# Patient Record
Sex: FEMALE | Race: WHITE | Employment: FULL TIME | URBAN - NONMETROPOLITAN AREA
[De-identification: names, ages, dates, MRNs, and addresses within clinical notes are randomized per-mention and may not be internally consistent; named-entity substitution may affect disease eponyms.]

---
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
## 2021-05-02 ENCOUNTER — HOSPITAL ENCOUNTER (OUTPATIENT)
Age: 21
Discharge: HOME | End: 2021-05-02
Attending: OBSTETRICS & GYNECOLOGY | Admitting: OBSTETRICS & GYNECOLOGY
Payer: COMMERCIAL

## 2021-05-02 VITALS
SYSTOLIC BLOOD PRESSURE: 115 MMHG | TEMPERATURE: 98.8 F | BODY MASS INDEX: 29.71 KG/M2 | HEIGHT: 64 IN | HEART RATE: 102 BPM | DIASTOLIC BLOOD PRESSURE: 75 MMHG | RESPIRATION RATE: 16 BRPM | WEIGHT: 174 LBS

## 2021-05-02 PROBLEM — Z3A.33 33 WEEKS GESTATION OF PREGNANCY: Status: ACTIVE | Noted: 2021-05-02

## 2021-05-02 PROCEDURE — 99211 OFF/OP EST MAY X REQ PHY/QHP: CPT

## 2021-05-02 RX ORDER — PROMETHAZINE HYDROCHLORIDE 12.5 MG/1
12.5 TABLET ORAL EVERY 6 HOURS PRN
Status: DISCONTINUED | OUTPATIENT
Start: 2021-05-02 | End: 2021-05-02 | Stop reason: HOSPADM

## 2021-05-02 RX ORDER — ACETAMINOPHEN 325 MG/1
650 TABLET ORAL EVERY 4 HOURS PRN
Status: DISCONTINUED | OUTPATIENT
Start: 2021-05-02 | End: 2021-05-02 | Stop reason: HOSPADM

## 2021-05-02 RX ORDER — ONDANSETRON 2 MG/ML
4 INJECTION INTRAMUSCULAR; INTRAVENOUS EVERY 6 HOURS PRN
Status: DISCONTINUED | OUTPATIENT
Start: 2021-05-02 | End: 2021-05-02 | Stop reason: HOSPADM

## 2021-05-02 RX ORDER — SODIUM CHLORIDE 0.9 % (FLUSH) 0.9 %
5-40 SYRINGE (ML) INJECTION PRN
Status: DISCONTINUED | OUTPATIENT
Start: 2021-05-02 | End: 2021-05-02 | Stop reason: HOSPADM

## 2021-05-02 RX ORDER — SODIUM CHLORIDE 0.9 % (FLUSH) 0.9 %
5-40 SYRINGE (ML) INJECTION EVERY 12 HOURS SCHEDULED
Status: DISCONTINUED | OUTPATIENT
Start: 2021-05-02 | End: 2021-05-02 | Stop reason: HOSPADM

## 2021-05-02 RX ORDER — SODIUM CHLORIDE 9 MG/ML
25 INJECTION, SOLUTION INTRAVENOUS PRN
Status: DISCONTINUED | OUTPATIENT
Start: 2021-05-02 | End: 2021-05-02 | Stop reason: HOSPADM

## 2021-05-02 NOTE — PROGRESS NOTES
Pt arrived to unit with c/o vaginal bleeding since 1200 today. Pt reports positive fetal movement. Denies headache, leaking of fluid, n/v, epigastric pain or contraction pain. EFM and TOCO applied to a soft, nontender abdomen. Will monitor and notify physician of arrival and status.

## 2021-05-07 ENCOUNTER — INITIAL PRENATAL (OUTPATIENT)
Dept: OBGYN CLINIC | Age: 21
End: 2021-05-07

## 2021-05-07 VITALS
WEIGHT: 179 LBS | HEART RATE: 92 BPM | DIASTOLIC BLOOD PRESSURE: 76 MMHG | SYSTOLIC BLOOD PRESSURE: 121 MMHG | BODY MASS INDEX: 30.73 KG/M2

## 2021-05-07 DIAGNOSIS — Z3A.34 34 WEEKS GESTATION OF PREGNANCY: Primary | ICD-10-CM

## 2021-05-07 PROCEDURE — 0502F SUBSEQUENT PRENATAL CARE: CPT | Performed by: OBSTETRICS & GYNECOLOGY

## 2021-05-07 NOTE — PROGRESS NOTES
Pt is here for prenatal appointment. She denies spotting. She has cramping and contractions, they aren't consistent.

## 2021-05-07 NOTE — PROGRESS NOTES
I, Martha Reyes RN, am scribing for and in the presence of Dr. Markie Campos 10:20 AM/sign      Subjective:  Cameron Lincoln is here for a return obstetrical visit. Today she is 34w1d weeks EGA. She is doing well, taking her PNV as directed, and has no complaints. Having some sylvia maxwell. She  does not have vaginal bleeding, leaking, contractions, syncope, or headaches. Pt does feel fetal movement regularly. Objective: Mother's Prenatal Vitals  BP: 121/76  Weight: 179 lb (81.2 kg)  Pulse: 92  Patient Position: Sitting  Prenatal Fetal Information  Fetal Heart Rate: US-125  Movement: Present  Cervical Exam  Presentation: Vertex  Pt is A&Ox3, in no acute distress. Normocephalic, atraumatic. PERRL. Resp even and non-labored. Skin pink, warm & dry. Gravid abdomen. DE LA CRUZ's well. Gait steady. Assessment:    IUP at 34w1d wks      Diagnosis Orders   1. 34 weeks gestation of pregnancy       Plan:  Pt counseled on balanced nutrition, adequate fluid intake, taking PNV daily, and exercise along with GHTN precautions, Kick count and  labor  Continue with routine prenatal care. OB records still pending   surveillance not indicated  RTC in 2 wks for prenatal visit    MEDICATIONS:  No orders of the defined types were placed in this encounter. ORDERS:  No orders of the defined types were placed in this encounter. Justin Marin MD, personally performed services described in this document as scribed by Leonard Lepe RN in my presence, and it is both accurate and complete.

## 2021-05-21 ENCOUNTER — ROUTINE PRENATAL (OUTPATIENT)
Dept: OBGYN CLINIC | Age: 21
End: 2021-05-21

## 2021-05-21 VITALS
SYSTOLIC BLOOD PRESSURE: 118 MMHG | WEIGHT: 184 LBS | HEART RATE: 100 BPM | BODY MASS INDEX: 31.58 KG/M2 | DIASTOLIC BLOOD PRESSURE: 72 MMHG

## 2021-05-21 DIAGNOSIS — Z3A.36 36 WEEKS GESTATION OF PREGNANCY: Primary | ICD-10-CM

## 2021-05-21 PROCEDURE — 0502F SUBSEQUENT PRENATAL CARE: CPT | Performed by: OBSTETRICS & GYNECOLOGY

## 2021-05-21 NOTE — PATIENT INSTRUCTIONS
[email or web address] and sign in to your Sy[employer]ix account. Enter D181 in the [facility] box to learn more about \"Weeks 34 to 36 of Your Pregnancy: Care Instructions. \"     If you do not have an account, please click on the \"Sign Up Now\" link. Current as of: October 8, 2020               Content Version: 12.8  © 2006-2021 [facility], [facility]. Care instructions adapted under license by [facility] ([facility]). If you have questions about a medical condition or this instruction, always ask your healthcare professional. [place] [address] any warranty or liability for your use of this information.

## 2021-05-24 LAB — GROUP B STREP CULTURE: NORMAL

## 2021-05-27 ENCOUNTER — HOSPITAL ENCOUNTER (OUTPATIENT)
Age: 21
Discharge: HOME | End: 2021-05-27
Attending: OBSTETRICS & GYNECOLOGY | Admitting: OBSTETRICS & GYNECOLOGY
Payer: COMMERCIAL

## 2021-05-27 VITALS
HEART RATE: 111 BPM | RESPIRATION RATE: 18 BRPM | DIASTOLIC BLOOD PRESSURE: 60 MMHG | SYSTOLIC BLOOD PRESSURE: 118 MMHG | TEMPERATURE: 97.8 F

## 2021-05-27 PROBLEM — O42.90 RUPTURE OF MEMBRANES WITH DELAY OF DELIVERY: Status: ACTIVE | Noted: 2021-05-27

## 2021-05-27 PROCEDURE — 99211 OFF/OP EST MAY X REQ PHY/QHP: CPT

## 2021-05-27 RX ORDER — ONDANSETRON 2 MG/ML
4 INJECTION INTRAMUSCULAR; INTRAVENOUS EVERY 6 HOURS PRN
Status: DISCONTINUED | OUTPATIENT
Start: 2021-05-27 | End: 2021-05-27 | Stop reason: HOSPADM

## 2021-05-27 RX ORDER — ACETAMINOPHEN 325 MG/1
650 TABLET ORAL EVERY 4 HOURS PRN
Status: DISCONTINUED | OUTPATIENT
Start: 2021-05-27 | End: 2021-05-27 | Stop reason: HOSPADM

## 2021-05-27 RX ORDER — PROMETHAZINE HYDROCHLORIDE 12.5 MG/1
12.5 TABLET ORAL EVERY 6 HOURS PRN
Status: DISCONTINUED | OUTPATIENT
Start: 2021-05-27 | End: 2021-05-27 | Stop reason: HOSPADM

## 2021-05-27 NOTE — FLOWSHEET NOTE
Patient presents to L&D with complaints of leaking of fluid after a scant amount of bleeding last night. Reports positive fetal movement. States she is is saturating thru undergarments and jeans.

## 2021-05-28 ENCOUNTER — ROUTINE PRENATAL (OUTPATIENT)
Dept: OBGYN CLINIC | Age: 21
End: 2021-05-28

## 2021-05-28 VITALS
BODY MASS INDEX: 31.76 KG/M2 | HEART RATE: 106 BPM | SYSTOLIC BLOOD PRESSURE: 119 MMHG | WEIGHT: 185 LBS | DIASTOLIC BLOOD PRESSURE: 74 MMHG

## 2021-05-28 DIAGNOSIS — Z34.83 ENCOUNTER FOR SUPERVISION OF OTHER NORMAL PREGNANCY IN THIRD TRIMESTER: Primary | ICD-10-CM

## 2021-05-28 DIAGNOSIS — O47.9 THREATENED LABOR AT TERM: ICD-10-CM

## 2021-05-28 DIAGNOSIS — Z3A.37 37 WEEKS GESTATION OF PREGNANCY: ICD-10-CM

## 2021-05-28 PROCEDURE — 0502F SUBSEQUENT PRENATAL CARE: CPT | Performed by: NURSE PRACTITIONER

## 2021-05-28 NOTE — PATIENT INSTRUCTIONS
Patient Education        Week 37 of Your Pregnancy: Care Instructions  Your Care Instructions     You are near the end of your pregnancy--and you're probably pretty uncomfortable. It may be harder to walk around. Lying down probably isn't comfortable either. You may have trouble getting to sleep or staying asleep. Most women deliver their babies between 40 and 41 weeks. This is a good time to think about packing a bag for the hospital with items you'll need. Then you'll be ready when labor starts. Follow-up care is a key part of your treatment and safety. Be sure to make and go to all appointments, and call your doctor if you are having problems. It's also a good idea to know your test results and keep a list of the medicines you take. How can you care for yourself at home? Learn about breastfeeding  · Breastfeeding is best for your baby and good for you. · Breast milk has antibodies to help your baby fight infections. · Mothers who breastfeed often lose weight faster, because making milk burns calories. · Learning the best ways to hold your baby will make breastfeeding easier. · Let your partner bathe and diaper the baby to keep your partner from feeling left out. Snuggle together when you breastfeed. · You may want to learn how to use a breast pump and store your milk. · If you choose to bottle feed, make the feeding feel like breastfeeding so you can bond with your baby. Always hold your baby and the bottle. Do not prop bottles or let your baby fall asleep with a bottle. Learn about crying  · It is common for babies to cry for 1 to 3 hours a day. Some cry more, some cry less. · Babies don't cry to make you upset or because you are a bad parent. · Crying is how your baby communicates. Your baby may be hungry; have gas; need a diaper change; or feel cold, warm, tired, lonely, or tense. Sometimes babies cry for unknown reasons.   · If you respond to your baby's needs, he or she will learn to trust you.  · Try to stay calm when your baby cries. Your baby may get more upset if he or she senses that you are upset. Know how to care for your   · Your baby's umbilical cord stump will drop off on its own, usually between 1 and 2 weeks. To care for your baby's umbilical cord area:  ? Clean the area at the bottom of the cord 2 or 3 times a day. ? Pay special attention to the area where the cord attaches to the skin. ? Keep the diaper folded below the cord. ? Use a damp washcloth or cotton ball to sponge bathe your baby until the stump has come off. · Your baby's first dark stool is called meconium. After the meconium is passed, your baby will develop his or her own bowel pattern. ? Some babies, especially  babies, have several bowel movements a day. Others have one or two a day, or one every 2 to 3 days. ?  babies often have loose, yellow stools. Formula-fed babies have more formed stools. ? If your baby's stools look like little pellets, he or she is constipated. After 2 days of constipation, call your baby's doctor. · If your baby will be circumcised, you can care for him at home. ? Gently rinse his penis with warm water after every diaper change. Do not try to remove the film that forms on the penis. This film will go away on its own. Pat dry. ? Put petroleum ointment, such as Vaseline, on the area of the diaper that will touch your baby's penis. This will keep the diaper from sticking to your baby. ? Ask the doctor about giving your baby acetaminophen (Tylenol) for pain. Where can you learn more? Go to https://chnedeb.healthSvpply. org and sign in to your [employer] account. Enter [id number] in the [employer] box to learn more about \"Week 37 of Your Pregnancy: Care Instructions. \"     If you do not have an account, please click on the \"Sign Up Now\" link. Current as of: 2020               Content Version: 12.8  © 6091-8370 Healthwise, [employer]. Care instructions adapted under license by [facility] ([facility]). If you have questions about a medical condition or this instruction, always ask your healthcare professional. [address] any warranty or liability for your use of this information.

## 2021-05-28 NOTE — PROGRESS NOTES
Elicia Duckworth is here for a return obstetrical visit. Today she is 37w1d weeks EGA. She is doing well and has no complaints. She  does not have vaginal bleeding, reports some leaking of fluid last night, contractions. Went to LDR and was elizabeth but negative for SROM. Neg Amnisure in office. She does not have blurred vision, SOB, or increased swelling in legs or face. Pt does feel fetal movement regularly. Objective: Mother's Prenatal Vitals  BP: 119/74  Weight: 185 lb (83.9 kg)  Pulse: 106  Patient Position: Sitting  Prenatal Fetal Information  Fundal Height (cm): 39 cm  Fetal Heart Rate: 139  Movement: Present  Cervical Exam  Dilation (cm): 3  Effacement (%): 70  Station: -2  Station (Labor Curve Graph): 7  Presentation: Vertex  Dil/Eff/Sta  Dilation (cm): 3  Effacement (%): 70  Station: -2  Pt is A&Ox3, in no acute distress. Normocephalic, atraumatic. PERRL. Resp even and non-labored. Skin pink, warm & dry. Gravid abdomen. DE LA CRUZ's well. Gait steady. Assessment:  IUP at 37w1d wks      Diagnosis Orders   1. Encounter for supervision of other normal pregnancy in third trimester     2. 37 weeks gestation of pregnancy       Plan:Pt counseled on labor precautions, GHTN precautions and Kick count  Continue with routine prenatal care. RTC in 1 wk for prenatal visit    MEDICATIONS:  No orders of the defined types were placed in this encounter. ORDERS:  No orders of the defined types were placed in this encounter.

## 2021-06-01 ENCOUNTER — HOSPITAL ENCOUNTER (OUTPATIENT)
Age: 21
Discharge: HOME | End: 2021-06-01
Attending: OBSTETRICS & GYNECOLOGY | Admitting: OBSTETRICS & GYNECOLOGY
Payer: COMMERCIAL

## 2021-06-01 ENCOUNTER — TELEPHONE (OUTPATIENT)
Dept: OBGYN CLINIC | Age: 21
End: 2021-06-01

## 2021-06-01 VITALS
SYSTOLIC BLOOD PRESSURE: 116 MMHG | TEMPERATURE: 97.6 F | DIASTOLIC BLOOD PRESSURE: 66 MMHG | RESPIRATION RATE: 18 BRPM | HEIGHT: 64 IN | BODY MASS INDEX: 31.07 KG/M2 | WEIGHT: 182 LBS | HEART RATE: 106 BPM

## 2021-06-01 PROBLEM — M54.50 LOW BACK PAIN: Status: ACTIVE | Noted: 2021-06-01

## 2021-06-01 LAB
AMORPHOUS: ABNORMAL /HPF
BACTERIA: ABNORMAL /HPF
BILIRUBIN URINE: NEGATIVE
BLOOD, URINE: NEGATIVE
CLARITY: ABNORMAL
COLOR: YELLOW
EPITHELIAL CELLS, UA: ABNORMAL /HPF
GLUCOSE URINE: NEGATIVE MG/DL
KETONES, URINE: NEGATIVE MG/DL
LEUKOCYTE ESTERASE, URINE: ABNORMAL
NITRITE, URINE: NEGATIVE
PH UA: 8.5 (ref 5–8)
PROTEIN UA: NEGATIVE MG/DL
RBC UA: ABNORMAL /HPF (ref 0–2)
SPECIFIC GRAVITY UA: 1.02 (ref 1–1.03)
UROBILINOGEN, URINE: 1 E.U./DL
WBC UA: ABNORMAL /HPF (ref 0–5)

## 2021-06-01 PROCEDURE — 99211 OFF/OP EST MAY X REQ PHY/QHP: CPT

## 2021-06-01 PROCEDURE — 84112 EVAL AMNIOTIC FLUID PROTEIN: CPT

## 2021-06-01 PROCEDURE — 81001 URINALYSIS AUTO W/SCOPE: CPT

## 2021-06-01 RX ORDER — CEPHALEXIN 500 MG/1
500 CAPSULE ORAL 2 TIMES DAILY
Qty: 14 CAPSULE | Refills: 0 | Status: SHIPPED | OUTPATIENT
Start: 2021-06-01 | End: 2021-06-03 | Stop reason: SDUPTHER

## 2021-06-01 NOTE — TELEPHONE ENCOUNTER
Pt called stating she called L & D yesterday having consistent right rib pain and being light headed. She was told this was normal. Last night and this AM pt has had a mucus discharge with a green color, no odor. Spoke with LENNOX Hernandez, pt is seeing Zoe Toro Thursday, this can be normal, but not the green discharge, if there is no odor, there can be some color to the mucus, even pink. As long as baby is still moving and no contractions, pt is to FU with Jose Rafael Young Thursday.  Pt v/u

## 2021-06-03 ENCOUNTER — ROUTINE PRENATAL (OUTPATIENT)
Dept: OBGYN CLINIC | Age: 21
End: 2021-06-03
Payer: COMMERCIAL

## 2021-06-03 ENCOUNTER — APPOINTMENT (OUTPATIENT)
Dept: ULTRASOUND IMAGING | Age: 21
End: 2021-06-03
Payer: COMMERCIAL

## 2021-06-03 ENCOUNTER — HOSPITAL ENCOUNTER (OUTPATIENT)
Age: 21
Discharge: HOME | End: 2021-06-03
Attending: OBSTETRICS & GYNECOLOGY | Admitting: OBSTETRICS & GYNECOLOGY
Payer: COMMERCIAL

## 2021-06-03 VITALS — SYSTOLIC BLOOD PRESSURE: 129 MMHG | BODY MASS INDEX: 31.41 KG/M2 | WEIGHT: 183 LBS | DIASTOLIC BLOOD PRESSURE: 76 MMHG

## 2021-06-03 VITALS — BODY MASS INDEX: 31.24 KG/M2 | TEMPERATURE: 98.4 F | HEIGHT: 64 IN | WEIGHT: 183 LBS | HEART RATE: 122 BPM

## 2021-06-03 DIAGNOSIS — Z3A.38 38 WEEKS GESTATION OF PREGNANCY: ICD-10-CM

## 2021-06-03 DIAGNOSIS — Z34.83 ENCOUNTER FOR SUPERVISION OF OTHER NORMAL PREGNANCY IN THIRD TRIMESTER: Primary | ICD-10-CM

## 2021-06-03 DIAGNOSIS — O36.8130 DECREASED FETAL MOVEMENTS IN THIRD TRIMESTER, SINGLE OR UNSPECIFIED FETUS: ICD-10-CM

## 2021-06-03 PROBLEM — O28.8 NST (NON-STRESS TEST) NONREACTIVE: Status: ACTIVE | Noted: 2021-06-03

## 2021-06-03 PROCEDURE — 99211 OFF/OP EST MAY X REQ PHY/QHP: CPT

## 2021-06-03 PROCEDURE — 0502F SUBSEQUENT PRENATAL CARE: CPT | Performed by: NURSE PRACTITIONER

## 2021-06-03 PROCEDURE — 76818 FETAL BIOPHYS PROFILE W/NST: CPT

## 2021-06-03 PROCEDURE — 59025 FETAL NON-STRESS TEST: CPT

## 2021-06-03 PROCEDURE — 59025 FETAL NON-STRESS TEST: CPT | Performed by: NURSE PRACTITIONER

## 2021-06-03 RX ORDER — CEPHALEXIN 500 MG/1
500 CAPSULE ORAL 2 TIMES DAILY
Qty: 14 CAPSULE | Refills: 0 | Status: ON HOLD | OUTPATIENT
Start: 2021-06-03 | End: 2021-06-11 | Stop reason: HOSPADM

## 2021-06-03 NOTE — PATIENT INSTRUCTIONS
Patient Education        Week 45 of Your Pregnancy: Care Instructions  Your Care Instructions     Believe it or not, your baby is almost here. You may have ideas about your baby's personality because of how much he or she moves. Or you may have noticed how he or she responds to sounds, warmth, cold, and light. You may even know what kind of music your baby likes. By now, you have a better idea of what to expect during delivery. You may have talked about your birth preferences with your doctor. But even if you want a vaginal birth, it is a good idea to learn about  births.  birth means that your baby is born through a cut (incision) in your lower belly. It is sometimes the best choice for the health of the baby and the mother. Follow-up care is a key part of your treatment and safety. Be sure to make and go to all appointments, and call your doctor if you are having problems. It's also a good idea to know your test results and keep a list of the medicines you take. How can you care for yourself at home? Learn about  birth  · Most C-sections are unplanned. They are done because of problems that occur during labor. These problems might include:  ? Labor that slows or stops. ? High blood pressure or other problems for the mother. ? Signs of distress in the baby. These signs may include a very fast or slow heart rate. · Although most mothers and babies do well after , it is major surgery. It has more risks than a vaginal delivery. · In some cases, a planned  may be safer than a vaginal delivery. This may be the case if:  ? The mother has a health problem, such as a heart condition. ? The baby isn't in a head-down position for delivery. This is called a breech position. ? The uterus has scars from past surgeries. This could increase the chance of a tear in the uterus. ? There is a problem with the placenta. ?  The mother has an infection, such as genital herpes, that could be spread to the baby. ? The mother is having twins or more. ? The baby weighs 9 to 10 pounds or more. · Because of the risks of , planned C-sections generally should be done only for medical reasons. And a planned  should be done at 39 weeks or later unless there is a medical reason to do it sooner. Know what to expect after delivery, and plan for the first few weeks at home  · You, your baby, and your partner or  will get identification bands. Only people with matching bands can  the baby from the nursery. · You will learn how to feed, diaper, and bathe your baby. And you will learn how to care for the umbilical cord stump. If your baby will be circumcised, you will also learn how to care for that. · Ask people to wait to visit you until you are at home. And ask them to wash their hands before they touch your baby. · Make sure you have another adult in your home for at least 2 or 3 days after the birth. · During the first 2 weeks, limit when friends and family can visit. · Do not allow visitors who have colds or infections. Make sure all visitors are up to date with their vaccinations. Never let anyone smoke around your baby. · Try to nap when the baby naps. Be aware of postpartum depression  · \"Baby blues\" are common for the first 1 to 2 weeks after birth. You may cry or feel sad or irritable for no reason. · For some women, these feelings last longer and are more intense. This is called postpartum depression. · If your symptoms last for more than a few weeks or you feel very depressed, ask your doctor for help. · Postpartum depression can be treated. Support groups and counseling can help. Sometimes medicine can also help. Where can you learn more? Go to https://stephani.Transactiv. org and sign in to your Ubimo account. Enter B044 in the [employer] box to learn more about \"Week 38 of Your Pregnancy: Care Instructions. \"     If you do not have an account, please click on the \"Sign Up Now\" link. Current as of: October 8, 2020               Content Version: 12.8  © 2006-2021 Healthwise, Incorporated. Care instructions adapted under license by [facility] ([facility]). If you have questions about a medical condition or this instruction, always ask your healthcare professional. [address] any warranty or liability for your use of this information.

## 2021-06-03 NOTE — PROGRESS NOTES
Liam Enamorado is here for a return obstetrical visit. Today she is 38w0d weeks EGA. She is doing well and has no complaints. She  does not have vaginal bleeding, leaking of fluid, contractions. She does not have blurred vision, SOB, or increased swelling in legs or face. Pt does not feel fetal movement regularly. Pt placed on NST and reassuring but not reactive. Sent to LDR for prolonged monitoring and BPP. Objective: Mother's Prenatal Vitals  BP: 129/76  Weight: 183 lb (83 kg)  Patient Position: Sitting  Alb/Glu  Albumin: Trace  Glucose: Negative  Prenatal Fetal Information  Fetal Heart Rate: NST-  Movement: (!) Decreased  Pt is A&Ox3, in no acute distress. Normocephalic, atraumatic. PERRL. Resp even and non-labored. Skin pink, warm & dry. Gravid abdomen. DE LA CRUZ's well. Gait steady. Assessment:  IUP at 38w0d wks      Diagnosis Orders   1. Encounter for supervision of other normal pregnancy in third trimester     2. 38 weeks gestation of pregnancy     3. Decreased fetal movements in third trimester, single or unspecified fetus  16901 - [place] F[place]ESS TEST     Plan:Pt counseled on labor precautions, GHTN precautions and Kick count  Continue with routine prenatal care. MEDICATIONS:  No orders of the defined types were placed in this encounter.       ORDERS:  Orders Placed This Encounter   Procedures    [id number] - [place] F[place]-STRESS TEST

## 2021-06-09 ENCOUNTER — HOSPITAL ENCOUNTER (INPATIENT)
Age: 21
LOS: 2 days | Discharge: HOME | End: 2021-06-11
Attending: OBSTETRICS & GYNECOLOGY | Admitting: OBSTETRICS & GYNECOLOGY
Payer: COMMERCIAL

## 2021-06-09 ENCOUNTER — APPOINTMENT (OUTPATIENT)
Dept: LABOR AND DELIVERY | Age: 21
End: 2021-06-09
Payer: COMMERCIAL

## 2021-06-09 PROBLEM — Z34.90 TERM PREGNANCY: Status: ACTIVE | Noted: 2021-06-09

## 2021-06-09 LAB
ABO/RH: NORMAL
AMPHETAMINE SCREEN, URINE: NEGATIVE
ANTIBODY SCREEN: NORMAL
BARBITURATE SCREEN URINE: NEGATIVE
BENZODIAZEPINE SCREEN, URINE: NEGATIVE
CANNABINOID SCREEN URINE: NEGATIVE
COCAINE METABOLITE SCREEN URINE: NEGATIVE
HCT VFR BLD CALC: 28.7 % (ref 37–47)
HEMOGLOBIN: 9 G/DL (ref 12–16)
Lab: NORMAL
MCH RBC QN AUTO: 24.3 PG (ref 27–31)
MCHC RBC AUTO-ENTMCNC: 31.4 G/DL (ref 33–37)
MCV RBC AUTO: 77.6 FL (ref 81–99)
OPIATE SCREEN URINE: NEGATIVE
PDW BLD-RTO: 15.5 % (ref 11.5–14.5)
PLATELET # BLD: 227 K/UL (ref 130–400)
PMV BLD AUTO: 11.6 FL (ref 9.4–12.3)
RBC # BLD: 3.7 M/UL (ref 4.2–5.4)
WBC # BLD: 8.8 K/UL (ref 4.8–10.8)

## 2021-06-09 PROCEDURE — 86900 BLOOD TYPING SEROLOGIC ABO: CPT

## 2021-06-09 PROCEDURE — 6360000002 HC RX W HCPCS: Performed by: OBSTETRICS & GYNECOLOGY

## 2021-06-09 PROCEDURE — 36415 COLL VENOUS BLD VENIPUNCTURE: CPT

## 2021-06-09 PROCEDURE — 6370000000 HC RX 637 (ALT 250 FOR IP): Performed by: OBSTETRICS & GYNECOLOGY

## 2021-06-09 PROCEDURE — 85027 COMPLETE CBC AUTOMATED: CPT

## 2021-06-09 PROCEDURE — 1220000000 HC SEMI PRIVATE OB R&B

## 2021-06-09 PROCEDURE — 86850 RBC ANTIBODY SCREEN: CPT

## 2021-06-09 PROCEDURE — 86901 BLOOD TYPING SEROLOGIC RH(D): CPT

## 2021-06-09 PROCEDURE — 86592 SYPHILIS TEST NON-TREP QUAL: CPT

## 2021-06-09 PROCEDURE — 80307 DRUG TEST PRSMV CHEM ANLYZR: CPT

## 2021-06-09 PROCEDURE — 7200000001 HC VAGINAL DELIVERY

## 2021-06-09 RX ORDER — SODIUM CHLORIDE, SODIUM LACTATE, POTASSIUM CHLORIDE, AND CALCIUM CHLORIDE .6; .31; .03; .02 G/100ML; G/100ML; G/100ML; G/100ML
500 INJECTION, SOLUTION INTRAVENOUS PRN
Status: DISCONTINUED | OUTPATIENT
Start: 2021-06-09 | End: 2021-06-11 | Stop reason: HOSPADM

## 2021-06-09 RX ORDER — ONDANSETRON 2 MG/ML
4 INJECTION INTRAMUSCULAR; INTRAVENOUS EVERY 6 HOURS PRN
Status: DISCONTINUED | OUTPATIENT
Start: 2021-06-09 | End: 2021-06-11 | Stop reason: HOSPADM

## 2021-06-09 RX ORDER — DOCUSATE SODIUM 100 MG/1
100 CAPSULE, LIQUID FILLED ORAL 2 TIMES DAILY
Status: DISCONTINUED | OUTPATIENT
Start: 2021-06-09 | End: 2021-06-11 | Stop reason: HOSPADM

## 2021-06-09 RX ORDER — SODIUM CHLORIDE, SODIUM LACTATE, POTASSIUM CHLORIDE, CALCIUM CHLORIDE 600; 310; 30; 20 MG/100ML; MG/100ML; MG/100ML; MG/100ML
INJECTION, SOLUTION INTRAVENOUS CONTINUOUS
Status: DISCONTINUED | OUTPATIENT
Start: 2021-06-09 | End: 2021-06-11 | Stop reason: HOSPADM

## 2021-06-09 RX ORDER — SODIUM CHLORIDE 0.9 % (FLUSH) 0.9 %
5-40 SYRINGE (ML) INJECTION EVERY 12 HOURS SCHEDULED
Status: DISCONTINUED | OUTPATIENT
Start: 2021-06-09 | End: 2021-06-11 | Stop reason: HOSPADM

## 2021-06-09 RX ORDER — IBUPROFEN 400 MG/1
800 TABLET ORAL EVERY 8 HOURS PRN
Status: DISCONTINUED | OUTPATIENT
Start: 2021-06-09 | End: 2021-06-11 | Stop reason: HOSPADM

## 2021-06-09 RX ORDER — SODIUM CHLORIDE 0.9 % (FLUSH) 0.9 %
5-40 SYRINGE (ML) INJECTION PRN
Status: DISCONTINUED | OUTPATIENT
Start: 2021-06-09 | End: 2021-06-11 | Stop reason: HOSPADM

## 2021-06-09 RX ORDER — SODIUM CHLORIDE, SODIUM LACTATE, POTASSIUM CHLORIDE, AND CALCIUM CHLORIDE .6; .31; .03; .02 G/100ML; G/100ML; G/100ML; G/100ML
1000 INJECTION, SOLUTION INTRAVENOUS PRN
Status: DISCONTINUED | OUTPATIENT
Start: 2021-06-09 | End: 2021-06-11 | Stop reason: HOSPADM

## 2021-06-09 RX ORDER — SODIUM CHLORIDE 9 MG/ML
25 INJECTION, SOLUTION INTRAVENOUS PRN
Status: DISCONTINUED | OUTPATIENT
Start: 2021-06-09 | End: 2021-06-11 | Stop reason: HOSPADM

## 2021-06-09 RX ORDER — OXYCODONE HYDROCHLORIDE AND ACETAMINOPHEN 5; 325 MG/1; MG/1
1 TABLET ORAL EVERY 4 HOURS PRN
Status: DISCONTINUED | OUTPATIENT
Start: 2021-06-09 | End: 2021-06-11 | Stop reason: HOSPADM

## 2021-06-09 RX ORDER — ACETAMINOPHEN 325 MG/1
650 TABLET ORAL EVERY 4 HOURS PRN
Status: DISCONTINUED | OUTPATIENT
Start: 2021-06-09 | End: 2021-06-11 | Stop reason: HOSPADM

## 2021-06-09 RX ADMIN — Medication 166.7 ML: at 15:45

## 2021-06-09 RX ADMIN — Medication 2 MILLI-UNITS/MIN: at 06:00

## 2021-06-09 RX ADMIN — DOCUSATE SODIUM 100 MG: 100 CAPSULE, LIQUID FILLED ORAL at 22:16

## 2021-06-09 RX ADMIN — OXYCODONE HYDROCHLORIDE AND ACETAMINOPHEN 1 TABLET: 5; 325 TABLET ORAL at 22:12

## 2021-06-09 RX ADMIN — IBUPROFEN 800 MG: 400 TABLET, FILM COATED ORAL at 17:25

## 2021-06-09 RX ADMIN — Medication 87.3 MILLI-UNITS/MIN: at 16:00

## 2021-06-09 ASSESSMENT — PAIN SCALES - GENERAL
PAINLEVEL_OUTOF10: 7
PAINLEVEL_OUTOF10: 7

## 2021-06-09 NOTE — PROGRESS NOTES
Pt writhing in bed c/o pressure. SVE at 1410: 9-10 cm. Dr. Margarita Harris notified. Pt continues to breathe with contractions. Family at bedside. US and toco adjusted.

## 2021-06-09 NOTE — LACTATION NOTE
Infant Name: Aziza Romero  Gestation: 38.6  Day of Life:   Birth weight:  Today's weight:  Weight loss:  24 hour summary of feeds:  Voids:  Stools:  Assistive device: none  Maternal History: ,   Breastfeeding history: yes, longest duration 2 wks. Maternal Medications: Keflex, PNV  Maternal Goal: one day at a time  Breast pump for home: yes    Assisted mother with positioning and latching baby to right breast, cross-cradle position. Hand expression taught, some colostrum noted. Jaw dropping sucks noted with gentle stimulation. Instructed mother to breastfeed every 2- 3 hours for 15-20 mins each side or on demand watching for hunger cues and using waking techniques when needed. 8-12 feedings in 24 hours being the goal. Hand expression and breast compressions encouraged to increase milk supply and transfer. Discussed the benefits of colostrum, skin to skin and the importance of good positioning and latch. Informed mother that baby can be very sleepy the first 24 hours and typically the 2nd night babies will be more awake and want to feed a lot and that this is normal and important in establishing milk supply. Discussed supply and demand. All questions answered at this time. Encouraged to call out for help when needed.

## 2021-06-09 NOTE — PROGRESS NOTES
Pt crying and screaming c/o pain and pressure. SVE- rim. Dr. Evelina Avila arrived [phone]. Pt started pushing. Delivery of viable male at 1440.

## 2021-06-10 LAB
HCT VFR BLD CALC: 31.5 % (ref 37–47)
HEMOGLOBIN: 9.7 G/DL (ref 12–16)
MCH RBC QN AUTO: 24.5 PG (ref 27–31)
MCHC RBC AUTO-ENTMCNC: 30.8 G/DL (ref 33–37)
MCV RBC AUTO: 79.5 FL (ref 81–99)
PDW BLD-RTO: 15.4 % (ref 11.5–14.5)
PLATELET # BLD: 257 K/UL (ref 130–400)
PMV BLD AUTO: 11.3 FL (ref 9.4–12.3)
RBC # BLD: 3.96 M/UL (ref 4.2–5.4)
RPR: NORMAL
WBC # BLD: 13.7 K/UL (ref 4.8–10.8)

## 2021-06-10 PROCEDURE — 59400 OBSTETRICAL CARE: CPT | Performed by: OBSTETRICS & GYNECOLOGY

## 2021-06-10 PROCEDURE — 36415 COLL VENOUS BLD VENIPUNCTURE: CPT

## 2021-06-10 PROCEDURE — 85027 COMPLETE CBC AUTOMATED: CPT

## 2021-06-10 PROCEDURE — 99024 POSTOP FOLLOW-UP VISIT: CPT | Performed by: NURSE PRACTITIONER

## 2021-06-10 PROCEDURE — 6370000000 HC RX 637 (ALT 250 FOR IP): Performed by: OBSTETRICS & GYNECOLOGY

## 2021-06-10 PROCEDURE — 1220000000 HC SEMI PRIVATE OB R&B

## 2021-06-10 RX ADMIN — DOCUSATE SODIUM 100 MG: 100 CAPSULE, LIQUID FILLED ORAL at 19:50

## 2021-06-10 RX ADMIN — IBUPROFEN 800 MG: 400 TABLET, FILM COATED ORAL at 03:10

## 2021-06-10 RX ADMIN — DOCUSATE SODIUM 100 MG: 100 CAPSULE, LIQUID FILLED ORAL at 08:38

## 2021-06-10 RX ADMIN — IBUPROFEN 800 MG: 400 TABLET, FILM COATED ORAL at 12:23

## 2021-06-10 ASSESSMENT — PAIN SCALES - GENERAL
PAINLEVEL_OUTOF10: 6
PAINLEVEL_OUTOF10: 6

## 2021-06-10 NOTE — LACTATION NOTE
Infant Name: Sujata Ferrell  Gestation: 38.6  Day of Life: 1  Birth weight: 8-7.8 lb (3850g)  Today's weight: 8-4.8 lb (3765g)  Weight loss: -2%  24 hour summary of feeds: Breastfeeding x 4, attempt x 3, formula x 1  Voids: 4  Stools: 4  Assistive device: none  Maternal History: ,   Breastfeeding history: yes, longest duration 2 wks. Maternal Medications: Keflex, PNV  Maternal Goal: one day at a time  Breast pump for home: yes     Instructed mother to breastfeed every 2- 3 hours for 15-20 mins each side or on demand watching for hunger cues and using waking techniques when needed. 8-12 feedings in 24 hours being the goal. Hand expression and breast compressions encouraged to increase milk supply and transfer. Reminded mother about supply and demand. Mother and baby will be discharged home tomorrow. Lactation will not be here. Instructions and handouts given over management of sore nipples, engorgement, plugged ducts, mastitis, hydration, nutrition, and medications that could effect milk supply. Mother knows when to call MD if needed. Encouraged mother to call out for help with feedings when needed. Lactation number provided.

## 2021-06-10 NOTE — PROGRESS NOTES
Subjective:     Postpartum Day 1: Vaginal Delivery    Patient is a [id number] The patient feels well. The patient denies emotional concerns. Pain is well controlled with current medications. The baby iswell. Baby is feeding via breast. Urinary output is adequate. The patient is ambulating well. The patient is tolerating a normal diet. Flatus has been passed. Objective:      Patient Vitals for the past 8 hrs:   BP Temp Temp src Pulse Resp SpO2   06/10/21 0614 119/75 97.7 °F (36.5 °C) Temporal 72 16 97 %   06/10/21 0215 100/60 97.4 °F (36.3 °C) Temporal 72 16 97 %     General:    alert, appears stated age and cooperative   Bowel Sounds:  active   Lochia:  appropriate   Uterine Fundus:   firm   Episiotomy:  healing well, no significant drainage, no dehiscence, no significant erythema   DVT Evaluation:  No evidence of DVT seen on physical exam.     Lab Results   Component Value Date    WBC 13.7 (H) 06/10/2021    HGB 9.7 (L) 06/10/2021    HCT 31.5 (L) 06/10/2021    MCV 79.5 (L) 06/10/2021     06/10/2021     Assessment:     Status post vaginal delivery. doing well post vaginal    Plan:     Continue current care.

## 2021-06-10 NOTE — H&P
file    Years of education: Not on file    Highest education level: Not on file   Occupational History    Not on file   Tobacco Use    Smoking status: Never Smoker    Smokeless tobacco: Never Used   Vaping Use    Vaping Use: Never used   Substance and Sexual Activity    Alcohol use: Never    Drug use: Never    Sexual activity: Yes     Partners: Male   Other Topics Concern    Not on file   Social History Narrative    Not on file     Social Determinants of Health     Financial Resource Strain:     Difficulty of Paying Living Expenses:    Food Insecurity:     Worried About Running Out of Food in the Last Year:     [address] in the Last Year:    Transportation Needs:     Lack of Transportation (Medical):  Lack of Transportation (Non-Medical):    Physical Activity:     Days of Exercise per Week:     Minutes of Exercise per Session:    Stress:     Feeling of Stress :    Social Connections:     Frequency of Communication with Friends and Family:     Frequency of Social Gatherings with Friends and Family:     Attends Holiness Services:     Active Member of Clubs or Organizations:     Attends Club or Organization Meetings:     Marital Status:    Intimate Partner Violence:     Fear of Current or Ex-Partner:     Emotionally Abused:     Physically Abused:     Sexually Abused:        Review Of Systems:  A comprehensive review of systems was negative except for what was noted in the HPI. Physical Exam:  Vitals:    06/09/21 1805 06/09/21 2140 06/10/21 0215 06/10/21 0614   BP: 107/69 110/60 100/60 119/75   Pulse: 101 79 72 72   Resp: 16 16 16 16   Temp: 98.4 °F (36.9 °C) 97.9 °F (36.6 °C) 97.4 °F (36.3 °C) 97.7 °F (36.5 °C)   TempSrc: Temporal Temporal Temporal Temporal   SpO2: 95% 97% 97% 97%   Weight:       Height:             General appearance:  awake, alert, cooperative, no apparent distress, and appears stated age  Neurologic:  Awake, alert, oriented to name, place and time. Motor is 5 out of 5 bilaterally. Lungs:  No increased work of breathing, good air exchange, clear to auscultation bilaterally, no crackles or wheezing  Heart:  Normal apical impulse, regular rate and rhythm  Abdomen: Gravid, soft, non-tender, non-distended    Pelvic Exam:  Cervix Check:   DILATION:  5 cm   EFFACEMENT:   75%   STATION:  -2 cm   CONSISTENCY:  medium   POSITION:  anterior        Lab Results:   Blood Type/Rh:    ABO/Rh   Date Value Ref Range Status   2021 O POS  Final     Antibody Screen:    Antibody Screen   Date Value Ref Range Status   2021 NEG  Final     Hemoglobin:   Hemoglobin   Date Value Ref Range Status   06/10/2021 9.7 (L) 12.0 - 16.0 g/dL Final     Hematocrit:   Hematocrit   Date Value Ref Range Status   06/10/2021 31.5 (L) 37.0 - 47.0 % Final     Platelet Count:   Platelets   Date Value Ref Range Status   06/10/2021 257 130 - 400 K/uL Final       Assessment:  Reema Harper is a 24 y.o. female   At 39w0d   IOL, elective    Plan:  Admit for delivery  Pain management  GBS Prophylaxis  Risks, benefits, alternatives and possible complications have been discussed in detail with the patient. Admission, and post admission procedures and expectations were discussed in detail. All questions were answered.       Marylou Mtz MD

## 2021-06-11 VITALS
WEIGHT: 182 LBS | SYSTOLIC BLOOD PRESSURE: 125 MMHG | OXYGEN SATURATION: 99 % | HEIGHT: 64 IN | BODY MASS INDEX: 31.07 KG/M2 | RESPIRATION RATE: 16 BRPM | HEART RATE: 76 BPM | TEMPERATURE: 97.9 F | DIASTOLIC BLOOD PRESSURE: 80 MMHG

## 2021-06-11 PROCEDURE — 6370000000 HC RX 637 (ALT 250 FOR IP): Performed by: OBSTETRICS & GYNECOLOGY

## 2021-06-11 PROCEDURE — 99024 POSTOP FOLLOW-UP VISIT: CPT | Performed by: NURSE PRACTITIONER

## 2021-06-11 RX ORDER — IBUPROFEN 800 MG/1
800 TABLET ORAL EVERY 8 HOURS PRN
Qty: 30 TABLET | Refills: 0 | Status: SHIPPED | OUTPATIENT
Start: 2021-06-11 | End: 2021-10-25 | Stop reason: ALTCHOICE

## 2021-06-11 RX ADMIN — DOCUSATE SODIUM 100 MG: 100 CAPSULE, LIQUID FILLED ORAL at 08:53

## 2021-06-11 NOTE — DISCHARGE SUMMARY
Subjective:     Postpartum Day 2: Vaginal Delivery    Patient is a [id number] The patient feels well. The patient denies emotional concerns. Pain is well controlled with current medications. The baby iswell. Baby is feeding via breast. Urinary output is adequate. The patient is ambulating well. The patient is tolerating a normal diet. Flatus has been passed. Objective:      Patient Vitals for the past 8 hrs:   BP Temp Temp src Pulse Resp SpO2   06/11/21 0635 125/80 97.9 °F (36.6 °C) Temporal 76 16 99 %     General:    alert, appears stated age and cooperative   Bowel Sounds:  active   Lochia:  appropriate   Uterine Fundus:   firm   Episiotomy:  healing well, no significant drainage, no dehiscence, no significant erythema   DVT Evaluation:  No evidence of DVT seen on physical exam.     Lab Results   Component Value Date    WBC 13.7 (H) 06/10/2021    HGB 9.7 (L) 06/10/2021    HCT 31.5 (L) 06/10/2021    MCV 79.5 (L) 06/10/2021     06/10/2021     Assessment:     Status post vaginal delivery. doing well post vaginal    Plan:     Discharge home with standard precautions and return to clinic in 6 weeks.

## 2021-06-18 NOTE — L&D DELIVERY NOTE
Mother's Information    Labor Events     labor?: No  Rupture type: Artificial=AROM, Intact  Fluid color: Clear  Fluid odor: None     Mother Delivery Information    Surgical or Additional Est. Blood Loss (mL): 0 (View Only): Edit in Flowsheets   Combined Est. Blood Loss (mL): 0        Messi Minaya [[id number]]    Labor Events     labor?: No   steroids?: None  Cervical ripening date/time:     Rupture date/time: 21 09:30:00   Rupture type: Artificial=AROM  Fluid color: Clear  Fluid odor: None  Induction: Oxytocin  Augmentation: AROM          Labor Event Times    Labor onset date/time: 21 06   Dilation complete date/time:  21   Start pushin2021   Decision time (emergent ): Anesthesia    Method: None     Assisted Delivery Details    Forceps attempted?: No  Vacuum extractor attempted?: No     Document Additional Attempt       Document Additional Attempt             Shoulder Dystocia    Shoulder dystocia present?: No  Add Second Maneuver  Add Third Maneuver  Add Fourth Maneuver  Add Fifth Maneuver  Add Sixth Maneuver  Add Seventh Maneuver  Add Eighth Maneuver  Add Ninth Maneuver      Information    Head delivery date/time: 2021 14:40:00   Changing the 's delivery date/time could affect patient care.:    Delivery date/time:  21 1440   Delivery type: Vaginal, Spontaneous    Details:        Delivery Providers    Delivering clinician: Myriam Nageotte, MD   Provider Role    Dawna Baum RN Registered Nurse    Carol Gamez RN Registered Nurse    Homa January, RCP Registered Nurse      Cord    Vessels: 3 Vessels  Complications: None  Cord clamped date/time: 2021 1442  Gases sent?: No  Stem cell collection (by provider):  No     Placenta    Date/time: 2021 14:45:00  Removal: Spontaneous  Appearance: Intact  Disposition: Discarded     Delivery Resuscitation    Method: Bulb Suction, Stimulation     [employer]

## 2021-09-24 ENCOUNTER — TELEPHONE (OUTPATIENT)
Dept: OBGYN CLINIC | Age: 21
End: 2021-09-24

## 2021-09-24 NOTE — TELEPHONE ENCOUNTER
Anna Elizalde called to schedule a Pregnancy confirmation . Please be advised that the best time to call her to accommodate their needs is Anytime. Thank you.

## 2021-09-27 DIAGNOSIS — Z36.89 CONFIRM FETAL CARDIAC ACTIVITY USING ULTRASOUND: Primary | ICD-10-CM

## 2021-10-25 ENCOUNTER — OFFICE VISIT (OUTPATIENT)
Dept: OBGYN CLINIC | Age: 21
End: 2021-10-25
Payer: COMMERCIAL

## 2021-10-25 VITALS
HEART RATE: 76 BPM | WEIGHT: 160 LBS | DIASTOLIC BLOOD PRESSURE: 64 MMHG | SYSTOLIC BLOOD PRESSURE: 103 MMHG | HEIGHT: 64 IN | BODY MASS INDEX: 27.31 KG/M2

## 2021-10-25 DIAGNOSIS — Z36.89 CONFIRM FETAL CARDIAC ACTIVITY USING ULTRASOUND: ICD-10-CM

## 2021-10-25 DIAGNOSIS — N91.2 AMENORRHEA: Primary | ICD-10-CM

## 2021-10-25 DIAGNOSIS — Z32.00 POSSIBLE PREGNANCY: ICD-10-CM

## 2021-10-25 PROCEDURE — 99213 OFFICE O/P EST LOW 20 MIN: CPT | Performed by: NURSE PRACTITIONER

## 2021-10-25 RX ORDER — PROMETHAZINE HYDROCHLORIDE 25 MG/1
25 TABLET ORAL 4 TIMES DAILY PRN
Qty: 30 TABLET | Refills: 0 | Status: SHIPPED | OUTPATIENT
Start: 2021-10-25 | End: 2021-11-01

## 2021-10-25 RX ORDER — ONDANSETRON 4 MG/1
4 TABLET, ORALLY DISINTEGRATING ORAL EVERY 8 HOURS PRN
Qty: 30 TABLET | Refills: 1 | Status: SHIPPED | OUTPATIENT
Start: 2021-10-25 | End: 2022-04-19

## 2021-10-25 RX ORDER — DOXYLAMINE SUCCINATE AND PYRIDOXINE HYDROCHLORIDE, DELAYED RELEASE TABLETS 10 MG/10 MG 10; 10 MG/1; MG/1
2 TABLET, DELAYED RELEASE ORAL DAILY
Qty: 60 TABLET | Refills: 1 | Status: SHIPPED | OUTPATIENT
Start: 2021-10-25 | End: 2022-04-19

## 2021-10-25 ASSESSMENT — ENCOUNTER SYMPTOMS
ALLERGIC/IMMUNOLOGIC NEGATIVE: 1
NAUSEA: 1
EYES NEGATIVE: 1
CONSTIPATION: 0
DIARRHEA: 0
RESPIRATORY NEGATIVE: 1

## 2021-10-25 NOTE — PATIENT INSTRUCTIONS
Patient Education        Learning About Pregnancy  Your Care Instructions     Your health in the early weeks of your pregnancy is particularly important for your baby's health. Take good care of yourself. Anything you do that harms your body can also harm your baby. Make sure to go to all of your doctor appointments. Regular checkups will help keep you and your baby healthy. How can you care for yourself at home? Diet    · Eat a balanced diet. Make sure your diet includes plenty of beans, peas, and leafy green vegetables.     · Do not skip meals or go for many hours without eating. If you are nauseated, try to eat a small, healthy snack every 2 to 3 hours.     · Do not eat fish that has a high level of mercury, such as shark, swordfish, or mackerel. Do not eat more than one can of tuna each week.     · Drink plenty of fluids. If you have kidney, heart, or liver disease and have to limit fluids, talk with your doctor before you increase the amount of fluids you drink.     · Cut down on caffeine, such as coffee, tea, and cola.     · Do not drink alcohol, such as beer, wine, or hard liquor.     · Take a multivitamin that contains at least 400 micrograms (mcg) of folic acid to help prevent birth defects. Fortified cereal and whole wheat bread are good additional sources of folic acid.     · Increase the calcium in your diet. Try to drink a quart of skim milk each day. You may also take calcium supplements and choose foods such as cheese and yogurt. Lifestyle    · Make sure you go to your follow-up appointments.     · Get plenty of rest. You may be unusually tired while you are pregnant.     · Get at least 30 minutes of exercise on most days of the week. Walking is a good choice. If you have not exercised in the past, start out slowly. Take several short walks each day.     · Do not smoke. If you need help quitting, talk to your doctor about stop-smoking programs.  These can increase your chances of quitting for good.     · Do not touch cat feces or litter boxes. Also, wash your hands after you handle raw meat, and fully cook all meat before you eat it. Wear gloves when you work in the yard or garden, and wash your hands well when you are done. Cat feces, raw or undercooked meat, and contaminated dirt can cause an infection that may harm your baby or lead to a miscarriage.     · Do not use saunas or hot tubs. Raising your body temperature may harm your baby.     · Avoid chemical fumes, paint fumes, or poisons.     · Do not use illegal drugs, marijuana, or alcohol. Medicines    · Review all of your medicines with your doctor. Some of your routine medicines may need to be changed to protect your baby.     · Use acetaminophen (Tylenol) to relieve minor problems, such as a mild headache or backache or a mild fever with cold symptoms. Do not use nonsteroidal anti-inflammatory drugs (NSAIDs), such as ibuprofen (Advil, Motrin) or naproxen (Aleve), unless your doctor says it is okay.     · Do not take two or more pain medicines at the same time unless the doctor told you to. Many pain medicines have acetaminophen, which is Tylenol. Too much acetaminophen (Tylenol) can be harmful.     · Take your medicines exactly as prescribed. Call your doctor if you think you are having a problem with your medicine. To manage morning sickness    · If you feel sick when you first wake up, try eating a small snack (such as crackers) before you get out of bed. Allow some time to digest the snack, and then get out of bed slowly.     · Do not skip meals or go for long periods without eating. An empty stomach can make nausea worse.     · Eat small, frequent meals instead of three large meals each day.     · Drink plenty of fluids.     · Eat foods that are high in protein but low in fat.     · If you are taking iron supplements, ask your doctor if they are necessary.  Iron can make nausea worse.     · Avoid any smells, such as coffee, that make you feel sick.     · Get lots of rest. Morning sickness may be worse when you are tired. Follow-up care is a key part of your treatment and safety. Be sure to make and go to all appointments, and call your doctor if you are having problems. It's also a good idea to know your test results and keep a list of the medicines you take. Where can you learn more? Go to https://MatchalarmpeUiTV.[employer]. org and sign in to your [employer] account. Enter A041 in the teextee box to learn more about \"Learning About Pregnancy. \"     If you do not have an account, please click on the \"Sign Up Now\" link. Current as of: June 16, 2021               Content Version: 13.0  © 2006-2021 Healthwise, Incorporated. Care instructions adapted under license by [facility] ([facility]). If you have questions about a medical condition or this instruction, always ask your healthcare professional. [place] [address] any warranty or liability for your use of this information.

## 2021-10-25 NOTE — PROGRESS NOTES
negative. Physical Exam  Vitals and nursing note reviewed. Constitutional:       Appearance: She is well-developed. HENT:      Head: Normocephalic. Right Ear: External ear normal.      Left Ear: External ear normal.      Nose: Nose normal.   Musculoskeletal:         General: Normal range of motion. Cervical back: Normal range of motion. Skin:     General: Skin is warm and dry. Neurological:      Mental Status: She is alert and oriented to person, place, and time. Psychiatric:         Attention and Perception: Attention normal.         Mood and Affect: Mood normal.         Speech: Speech normal.         Behavior: Behavior normal.         Thought Content: Thought content normal.         Cognition and Memory: Cognition normal.         Judgment: Judgment normal.          Diagnosis Orders   1. Amenorrhea     2. Possible pregnancy  US OB TRANSVAGINAL    External Referral To Maternal Fetal Medicine   3.  Confirm fetal cardiac activity using ultrasound  US OB TRANSVAGINAL       MEDICATIONS:  Orders Placed This Encounter   Medications    promethazine (PHENERGAN) 25 MG tablet     Sig: Take 1 tablet by mouth 4 times daily as needed for Nausea     Dispense:  30 tablet     Refill:  0    ondansetron (ZOFRAN ODT) 4 MG disintegrating tablet     Sig: Take 1 tablet by mouth every 8 hours as needed for Nausea or Vomiting     Dispense:  30 tablet     Refill:  1    doxylamine-pyridoxine 10-10 MG TBEC     Sig: Take 2 tablets by mouth daily     Dispense:  60 tablet     Refill:  1       ORDERS:  Orders Placed This Encounter   Procedures    US OB TRANSVAGINAL    External Referral To Maternal Fetal Medicine       PLAN:  Plan viability u/s today with PNG  Hold on NOB labs today and will do at 10 weeks with genetic testing  Starting medications for NV  F/u in 2 weeks    Patient Instructions     Patient Education        Learning About Pregnancy  Your Care Instructions     Your health in the early weeks of your pregnancy is particularly important for your baby's health. Take good care of yourself. Anything you do that harms your body can also harm your baby. Make sure to go to all of your doctor appointments. Regular checkups will help keep you and your baby healthy. How can you care for yourself at home? Diet    · Eat a balanced diet. Make sure your diet includes plenty of beans, peas, and leafy green vegetables.     · Do not skip meals or go for many hours without eating. If you are nauseated, try to eat a small, healthy snack every 2 to 3 hours.     · Do not eat fish that has a high level of mercury, such as shark, swordfish, or mackerel. Do not eat more than one can of tuna each week.     · Drink plenty of fluids. If you have kidney, heart, or liver disease and have to limit fluids, talk with your doctor before you increase the amount of fluids you drink.     · Cut down on caffeine, such as coffee, tea, and cola.     · Do not drink alcohol, such as beer, wine, or hard liquor.     · Take a multivitamin that contains at least 400 micrograms (mcg) of folic acid to help prevent birth defects. Fortified cereal and whole wheat bread are good additional sources of folic acid.     · Increase the calcium in your diet. Try to drink a quart of skim milk each day. You may also take calcium supplements and choose foods such as cheese and yogurt. Lifestyle    · Make sure you go to your follow-up appointments.     · Get plenty of rest. You may be unusually tired while you are pregnant.     · Get at least 30 minutes of exercise on most days of the week. Walking is a good choice. If you have not exercised in the past, start out slowly. Take several short walks each day.     · Do not smoke. If you need help quitting, talk to your doctor about stop-smoking programs. These can increase your chances of quitting for good.     · Do not touch cat feces or litter boxes.  Also, wash your hands after you handle raw meat, and fully cook all meat before you eat it. Wear gloves when you work in the yard or garden, and wash your hands well when you are done. Cat feces, raw or undercooked meat, and contaminated dirt can cause an infection that may harm your baby or lead to a miscarriage.     · Do not use saunas or hot tubs. Raising your body temperature may harm your baby.     · Avoid chemical fumes, paint fumes, or poisons.     · Do not use illegal drugs, marijuana, or alcohol. Medicines    · Review all of your medicines with your doctor. Some of your routine medicines may need to be changed to protect your baby.     · Use acetaminophen (Tylenol) to relieve minor problems, such as a mild headache or backache or a mild fever with cold symptoms. Do not use nonsteroidal anti-inflammatory drugs (NSAIDs), such as ibuprofen (Advil, Motrin) or naproxen (Aleve), unless your doctor says it is okay.     · Do not take two or more pain medicines at the same time unless the doctor told you to. Many pain medicines have acetaminophen, which is Tylenol. Too much acetaminophen (Tylenol) can be harmful.     · Take your medicines exactly as prescribed. Call your doctor if you think you are having a problem with your medicine. To manage morning sickness    · If you feel sick when you first wake up, try eating a small snack (such as crackers) before you get out of bed. Allow some time to digest the snack, and then get out of bed slowly.     · Do not skip meals or go for long periods without eating. An empty stomach can make nausea worse.     · Eat small, frequent meals instead of three large meals each day.     · Drink plenty of fluids.     · Eat foods that are high in protein but low in fat.     · If you are taking iron supplements, ask your doctor if they are necessary. Iron can make nausea worse.     · Avoid any smells, such as coffee, that make you feel sick.     · Get lots of rest. Morning sickness may be worse when you are tired.    Follow-up care is a key part of your treatment and safety. Be sure to make and go to all appointments, and call your doctor if you are having problems. It's also a good idea to know your test results and keep a list of the medicines you take. Where can you learn more? Go to https://chanthony."[employer]. org and sign in to your [employer] account. Enter P039 in the Eyegroove box to learn more about \"Learning About Pregnancy. \"     If you do not have an account, please click on the \"Sign Up Now\" link. Current as of: June 16, 2021               Content Version: 13.0  © 5784-0073 Healthwise, Incorporated. Care instructions adapted under license by [facility] ([facility]). If you have questions about a medical condition or this instruction, always ask your healthcare professional. [address] any warranty or liability for your use of this information.

## 2021-11-09 ENCOUNTER — INITIAL PRENATAL (OUTPATIENT)
Dept: OBGYN CLINIC | Age: 21
End: 2021-11-09
Payer: MEDICAID

## 2021-11-09 VITALS
HEART RATE: 86 BPM | WEIGHT: 159 LBS | DIASTOLIC BLOOD PRESSURE: 76 MMHG | BODY MASS INDEX: 27.29 KG/M2 | SYSTOLIC BLOOD PRESSURE: 108 MMHG

## 2021-11-09 DIAGNOSIS — Z3A.10 10 WEEKS GESTATION OF PREGNANCY: ICD-10-CM

## 2021-11-09 DIAGNOSIS — Z34.80 SUPERVISION OF OTHER NORMAL PREGNANCY: Primary | ICD-10-CM

## 2021-11-09 LAB
ABO/RH: NORMAL
ANTIBODY SCREEN: NORMAL

## 2021-11-09 PROCEDURE — 99213 OFFICE O/P EST LOW 20 MIN: CPT | Performed by: NURSE PRACTITIONER

## 2021-11-09 NOTE — PROGRESS NOTES
Britta Hutchinson is here for a return obstetrical visit. Today she is 10w3d weeks EGA. She is doing well and has no complaints. She  does not have vaginal bleeding, leaking of fluid, contractions. She does not have blurred vision, SOB, or increased swelling in legs or face. Pt does not feel fetal movement regularly. Objective: Mother's Prenatal Vitals  BP: 108/76  Weight: 159 lb (72.1 kg)  Pulse: 86  Patient Position: Sitting  Prenatal Fetal Information  Fetal Heart Rate: US-  Movement: Absent  Pt is A&Ox3, in no acute distress. Normocephalic, atraumatic. PERRL. Resp even and non-labored. Skin pink, warm & dry. Gravid abdomen. DE LA CRUZ's well. Gait steady. Assessment:  IUP at 10w3d wks      Diagnosis Orders   1. Supervision of other normal pregnancy     2. 10 weeks gestation of pregnancy  HIV Obstetric Panel    Culture, Urine    Chlamydia/N. Gonorrhoeae/T. Vaginalis    Varicella Zoster Antibody, IgG     Plan:Pt counseled on Genetic testing  Continue with routine prenatal care. RTC in 4 wk for prenatal visit    MEDICATIONS:  No orders of the defined types were placed in this encounter. ORDERS:  Orders Placed This Encounter   Procedures    Culture, Urine    Chlamydia/N. Gonorrhoeae/T. Vaginalis    HIV Obstetric Panel    Varicella Zoster Antibody, IgG

## 2021-11-09 NOTE — PATIENT INSTRUCTIONS
Patient Education        Weeks 10 to 14 of Your Pregnancy: Care Instructions  Overview     By weeks 10 to 15 of your pregnancy, the placenta has formed inside your uterus. The placenta's main job is to give your baby oxygen and nutrients through the umbilical cord. It's possible to hear your baby's heartbeat with a special ultrasound device. Your baby's organs are developing. The arms and legs can bend. This is a good time to think about testing for birth defects. There are two types of tests: screening and diagnostic. Screening tests show the chance that a baby has a certain birth defect. They can't tell you for sure that your baby has a problem. Diagnostic tests show if a baby has a certain birth defect. It's your choice whether to have these tests. You and your partner can talk to your doctor or midwife about tests for birth defects. Follow-up care is a key part of your treatment and safety. Be sure to make and go to all appointments, and call your doctor if you are having problems. It's also a good idea to know your test results and keep a list of the medicines you take. How can you care for yourself at home? Decide about tests  · You can have screening tests and diagnostic tests to check for birth defects. The decision to have a test for birth defects is personal. Think about your age, your chance of passing on a family disease, your need to know about any problems, and what you might do after you have the test results. ? Quadruple (quad) blood test. This screening test can be done between 15 and 22 weeks of pregnancy. It checks the amount of four substances in your blood. The doctor looks at these test results, along with your age and other factors, to find out the chance that your baby may have certain problems. ? Amniocentesis. This diagnostic test is used to look for chromosomal problems in the baby's cells.  It can be done between 15 and 20 weeks of pregnancy, usually around week 16.  ? Nuchal translucency test. This test uses ultrasound to measure the thickness of the area at the back of the baby's neck. An increase in the thickness can be an early sign of Down syndrome. ? Chorionic villus sampling (CVS). This is a test that looks for certain genetic problems with your baby. The same genes that are in your baby are in the placenta. A small piece of the placenta is taken out and tested. This test is done when you are 10 to 13 weeks pregnant. Ease discomfort  · Slow down and take naps when you feel tired. · If your emotions swing, talk to someone. · If your gums bleed, try a softer toothbrush. If your gums are puffy and bleed a lot, see your dentist.  · If you feel dizzy:  ? Get up slowly after sitting or lying down. ? Drink plenty of fluids. ? Eat small snacks to keep your blood sugar stable. ? Put your head between your legs as though you were tying your shoelaces. ? Lie down with your legs higher than your head. Use pillows to prop up your feet. · If you have a headache:  ? Lie down. ? Ask your partner or a good friend for a neck massage. ? Try cool cloths over your forehead or across the back of your neck. ? Use acetaminophen (Tylenol) for pain relief. Do not use nonsteroidal anti-inflammatory drugs (NSAIDs), such as ibuprofen (Advil, Motrin) or naproxen (Aleve), unless your doctor says it is okay. · If you have a nosebleed, pinch your nose gently, and hold it for a short while. To prevent nosebleeds, try massaging a small dab of petroleum jelly, such as Vaseline, in your nostrils. · If your nose is stuffed up, try saline (saltwater) nose sprays. Do not use decongestant sprays. Care for your breasts  · Wear a bra that gives you good support. · Know that changes in your breasts are normal.  ? Your breasts may get larger and more tender. Tenderness usually gets better by 12 weeks. ? Your nipples may get darker and larger, and small bumps around your nipples may show more. ?  The veins in your chest and breasts may show more. Where can you learn more? Go to https://chpepiceweb.healthTrelligence. org and sign in to your [employer] account. Enter X894 in the [employer] box to learn more about \"Weeks 10 to 14 of Your Pregnancy: Care Instructions. \"     If you do not have an account, please click on the \"Sign Up Now\" link. Current as of: June 16, 2021               Content Version: 13.0  © 1016-7658 Healthwise, Incorporated. Care instructions adapted under license by [facility] ([facility]). If you have questions about a medical condition or this instruction, always ask your healthcare professional. [address] any warranty or liability for your use of this information.

## 2021-11-09 NOTE — PROGRESS NOTES
Pt presents today for routine prenatal visit. Pt denies vaginal bleeding or leaking of fluid. She has had a little cramping.

## 2021-11-10 LAB
C. TRACHOMATIS DNA ,URINE: NEGATIVE
N. GONORRHOEAE DNA, URINE: NEGATIVE
TRICHOMONAS VAGINALIS DNA, URINE: NEGATIVE

## 2021-11-11 LAB
BASOPHILS ABSOLUTE: 0 K/UL (ref 0–0.2)
BASOPHILS RELATIVE PERCENT: 0.4 % (ref 0–1)
EOSINOPHILS ABSOLUTE: 0.1 K/UL (ref 0–0.6)
EOSINOPHILS RELATIVE PERCENT: 0.7 % (ref 0–5)
HCT VFR BLD CALC: 38.7 % (ref 37–47)
HEMOGLOBIN: 12.2 G/DL (ref 12–16)
HEPATITIS B SURFACE ANTIGEN INTERPRETATION: ABNORMAL
HIV-1 P24 AG: ABNORMAL
IMMATURE GRANULOCYTES #: 0 K/UL
LYMPHOCYTES ABSOLUTE: 1.9 K/UL (ref 1.1–4.5)
LYMPHOCYTES RELATIVE PERCENT: 26.6 % (ref 20–40)
MCH RBC QN AUTO: 25.2 PG (ref 27–31)
MCHC RBC AUTO-ENTMCNC: 31.5 G/DL (ref 33–37)
MCV RBC AUTO: 80 FL (ref 81–99)
MONOCYTES ABSOLUTE: 0.3 K/UL (ref 0–0.9)
MONOCYTES RELATIVE PERCENT: 4.4 % (ref 0–10)
NEUTROPHILS ABSOLUTE: 4.8 K/UL (ref 1.5–7.5)
NEUTROPHILS RELATIVE PERCENT: 67.6 % (ref 50–65)
ORGANISM: ABNORMAL
PDW BLD-RTO: 15.5 % (ref 11.5–14.5)
PLATELET # BLD: 289 K/UL (ref 130–400)
PMV BLD AUTO: 10.9 FL (ref 9.4–12.3)
RAPID HIV 1&2: ABNORMAL
RBC # BLD: 4.84 M/UL (ref 4.2–5.4)
RPR: ABNORMAL
RUBELLA ANTIBODY IGG: ABNORMAL
URINE CULTURE, ROUTINE: ABNORMAL
URINE CULTURE, ROUTINE: ABNORMAL
WBC # BLD: 7.1 K/UL (ref 4.8–10.8)

## 2021-11-11 RX ORDER — AMPICILLIN 500 MG/1
500 CAPSULE ORAL 3 TIMES DAILY
Qty: 21 CAPSULE | Refills: 0 | Status: SHIPPED | OUTPATIENT
Start: 2021-11-11 | End: 2021-11-18

## 2021-11-15 LAB — VZV IGG SER QL IA: 0.7

## 2021-12-08 ENCOUNTER — ROUTINE PRENATAL (OUTPATIENT)
Dept: OBGYN CLINIC | Age: 21
End: 2021-12-08
Payer: COMMERCIAL

## 2021-12-08 VITALS
BODY MASS INDEX: 27.12 KG/M2 | HEART RATE: 85 BPM | SYSTOLIC BLOOD PRESSURE: 119 MMHG | DIASTOLIC BLOOD PRESSURE: 73 MMHG | WEIGHT: 158 LBS

## 2021-12-08 DIAGNOSIS — Z3A.14 14 WEEKS GESTATION OF PREGNANCY: Primary | ICD-10-CM

## 2021-12-08 DIAGNOSIS — O26.852 SPOTTING AFFECTING PREGNANCY IN SECOND TRIMESTER: ICD-10-CM

## 2021-12-08 DIAGNOSIS — Z23 FLU VACCINE NEED: ICD-10-CM

## 2021-12-08 PROCEDURE — 90471 IMMUNIZATION ADMIN: CPT | Performed by: OBSTETRICS & GYNECOLOGY

## 2021-12-08 PROCEDURE — 0502F SUBSEQUENT PRENATAL CARE: CPT | Performed by: OBSTETRICS & GYNECOLOGY

## 2021-12-08 PROCEDURE — 90674 CCIIV4 VAC NO PRSV 0.5 ML IM: CPT | Performed by: OBSTETRICS & GYNECOLOGY

## 2021-12-08 NOTE — PROGRESS NOTES
Pt presents today for a routine prenatal visit. Pt denies vaginal bleeding, leaking of fluid or cramping. After obtaining consent, and per orders of Dr. Mary Mccurdy, injection of Flucelvax given in Left deltoid by Jeremias Ambriz MA. Patient instructed to remain in clinic for 20 minutes afterwards, and to report any adverse reaction to me immediately.

## 2021-12-08 NOTE — PROGRESS NOTES
IMartha RN, am scribing for and in the presence of Dr. Tata Tillman 12/8/2021/12:24 PM/sign      Subjective:  Bryant Gaviria is here for a return obstetrical visit. Today she is 14w4d weeks EGA. She is having intermittent vaginal spotting and cramping. Objective: Mother's Prenatal Vitals  BP: 119/73  Weight: 158 lb (71.7 kg)  Pulse: 85  Patient Position: Sitting  Prenatal Fetal Information  Fetal Heart Rate: 152/US  Movement: Absent  Pt is A&Ox3, in no acute distress. Normocephalic, atraumatic. PERRL. Resp even and non-labored. Skin pink, warm & dry. Gravid abdomen. DE LA CRUZ's well. Gait steady. Assessment:    IUP at 14w4d wks      Diagnosis Orders   1. 14 weeks gestation of pregnancy     2. Spotting affecting pregnancy in second trimester     3. Flu vaccine need  INFLUENZA, MDCK QUADV, 2 YRS AND OLDER, IM, PF, PREFILL SYR OR SDV, 0.5ML (FLUCELVAX QUADV, PF)     Plan:  Pt counseled on SAB and reviewed genetic testing results  [facility]estine Rubio [facility]  Continue with routine prenatal care. RTC in 4 wks for prenatal visit  MEDICATIONS:  No orders of the defined types were placed in this encounter. ORDERS:  Orders Placed This Encounter   Procedures    INFLUENZA, MDCK QUADV, 2 YRS AND OLDER, IM, PF, PREFILL SYR OR SDV, 0.5ML (Jennifer Finders, PF)       I, Tata Tillman MD, personally performed services described in this document as scribed by Macrina Wood RN in my presence, and it is both accurate and complete.

## 2021-12-08 NOTE — PATIENT INSTRUCTIONS
Patient Education        Weeks 14 to 18 of Your Pregnancy: Care Instructions  Overview     During this time, you may start to \"show,\" so that you look pregnant to people around you. You may also notice some changes in your skin, such as itchy spots on your palms or acne on your face. Your baby is now able to pass urine. And your baby's first stool (meconium) is starting to collect in your baby's intestines. Hair is also starting to grow on your baby's head. At your next visit, between weeks 18 and 20, your doctor may do an ultrasound test. The test allows your doctor to check for certain problems. Your doctor can also tell the sex of your baby. So this a good time to think about whether you want to know. Talk to your doctor about getting a flu shot to help keep you healthy during your pregnancy. As your pregnancy moves along, it's common to worry or feel anxious. Your body is changing a lot. And you are thinking about giving birth, the health of your baby, and becoming a parent. You can talk to your doctor about any anxiety and stress you feel. Follow-up care is a key part of your treatment and safety. Be sure to make and go to all appointments, and call your doctor if you are having problems. It's also a good idea to know your test results and keep a list of the medicines you take. How can you care for yourself at home? Reduce stress    · Ask for help with cooking and housekeeping.     · Figure out who or what causes your stress. Avoid these people or situations as much as possible.     · Relax every day. Taking 10- to 15-minute breaks can make a big difference. Take a walk, listen to music, or take a warm bath.     · Learn relaxation techniques at prenatal or yoga class. Or buy a relaxation tape.     · List your fears about having a baby and becoming a parent. Share the list with someone you trust. Decide which worries are really small, and try to let them go.    Exercise    · If you did not exercise much before pregnancy, start slowly. Walking is best. [employer], and do a little more every day.     · Brisk walking, easy jogging, low-impact aerobics, water aerobics, and yoga are good choices. Some sports, such as scuba diving, horseback riding, downhill skiing, gymnastics, and water skiing, are not a good idea.     · Try to do at least 2½ hours a week of moderate exercise, such as a fast walk. One way to do this is to be active 30 minutes a day, at least 5 days a week.     · Wear loose clothing. And wear shoes and a bra that provide good support.     · Warm up and cool down to start and finish your exercise.     · If you want to use weights, be sure to use light weights. They reduce stress on your joints. Stay at the best weight for you    · Experts recommend that you gain about 1 pound a month during the first 3 months of your pregnancy.     · Experts recommend that you gain about 1 pound a week during your last 6 months of pregnancy, for a total weight gain of 25 to 35 pounds.     · If you are underweight, you will need to gain more weight (about 28 to 40 pounds).     · If you are overweight, you may not need to gain as much weight (about 15 to 25 pounds).     · If you are gaining weight too fast, use common sense. Exercise every day, and limit sweets, fast foods, and fats. Choose lean meats, fruits, and vegetables.     · If you are having twins or more, your doctor may refer you to a dietitian. Where can you learn more? Go to https://ENDOGENXanthony.healthSand Technology. org and sign in to your [employer] account. Enter U049 in the [facility] box to learn more about \"Weeks 14 to 18 of Your Pregnancy: Care Instructions. \"     If you do not have an account, please click on the \"Sign Up Now\" link. Current as of: June 16, 2021               Content Version: 13.0  © 4824-8242 Healthwise, Incorporated. Care instructions adapted under license by [facility] ([facility]).  If you have questions about a medical condition or this instruction, always ask your healthcare professional. [place] [address] any warranty or liability for your use of this information.

## 2022-01-05 ENCOUNTER — ROUTINE PRENATAL (OUTPATIENT)
Dept: OBGYN CLINIC | Age: 22
End: 2022-01-05
Payer: COMMERCIAL

## 2022-01-05 VITALS
WEIGHT: 161 LBS | BODY MASS INDEX: 27.64 KG/M2 | DIASTOLIC BLOOD PRESSURE: 68 MMHG | HEART RATE: 83 BPM | SYSTOLIC BLOOD PRESSURE: 120 MMHG

## 2022-01-05 DIAGNOSIS — Z34.82 ENCOUNTER FOR SUPERVISION OF OTHER NORMAL PREGNANCY IN SECOND TRIMESTER: Primary | ICD-10-CM

## 2022-01-05 PROCEDURE — 0502F SUBSEQUENT PRENATAL CARE: CPT | Performed by: OBSTETRICS & GYNECOLOGY

## 2022-01-05 NOTE — PROGRESS NOTES
I, Martha Reyes RN, am scribing for and in the presence of Dr. Blas Hence 1/5/2022/10:05 AM/sign      Subjective:  Naomia Spurling is here for a return obstetrical visit. Today she is 18w4d weeks EGA. She is doing well, taking her PNV as directed, and has no complaints. She  does not have vaginal bleeding, leaking , contractions, syncope, or headaches. Pt does feel fetal movement regularly. Objective: Mother's Prenatal Vitals  BP: 120/68  Weight: 161 lb (73 kg)  Pulse: 83  Patient Position: Sitting  Prenatal Fetal Information  Fetal Heart Rate: 152  Movement: Absent  Pt is A&Ox3, in no acute distress. Normocephalic, atraumatic. PERRL. Resp even and non-labored. Skin pink, warm & dry. Gravid abdomen. DE LA CRUZ's well. Gait steady. Assessment:    IUP at 18w4d wks      Diagnosis Orders   1. Encounter for supervision of other normal pregnancy in second trimester       Plan:  Pt counseled on balanced nutrition, adequate fluid intake, taking PNV daily, and exercise along with GHTN precautions  Continue with routine prenatal care. Anatomy scan is sarah'd with [facility]  RTC in 4 wks for prenatal visit    MEDICATIONS:  No orders of the defined types were placed in this encounter. ORDERS:  No orders of the defined types were placed in this encounter. Lisbet Barragan MD, personally performed services described in this document as scribed by Patria Wild RN in my presence, and it is both accurate and complete.

## 2022-01-05 NOTE — PATIENT INSTRUCTIONS
Patient Education        Weeks 18 to 22 of Your Pregnancy: Care Instructions  Overview     Your baby is continuing to develop quickly. Sometime between 18 and 22 weeks, you'll probably start to feel your baby move. At first, these small fetal movements feel like fluttering or \"butterflies. \" Or they may feel like gas bubbles. As your baby grows, these movements will become stronger. You may also notice that your baby hiccups. Babies at this stage can now suck their thumbs. You may find that your nausea and fatigue are gone. You may feel better overall and have more energy than you did in your first trimester. But you might now also have some new discomforts, like sleep problems or leg cramps. Talk to your doctor about things you can do at home to ease these problems. Follow-up care is a key part of your treatment and safety. Be sure to make and go to all appointments, and call your doctor if you are having problems. It's also a good idea to know your test results and keep a list of the medicines you take. How can you care for yourself at home? Ease sleep problems  · Avoid caffeine in drinks or chocolate late in the day. · Get some exercise every day. · Take a warm shower or bath before bed. · Have a light snack or glass of milk at bedtime. · Do relaxation exercises in bed to calm your mind and body. · Support your legs and back with extra pillows. Try a pillow between your legs if you sleep on your side. · Do not use sleeping pills or alcohol. They could harm your baby. Ease leg cramps  · Do not massage your calf during the cramp. · Sit on a firm bed or chair. Straighten your leg, and bend your foot (flex your ankle) slowly upward, toward your knee. Bend your toes up and down. · Stand on a cool, flat surface. Stretch your toes upward, and take small steps walking on your heels. · Use a heating pad or hot water bottle to help with muscle ache. Prevent leg cramps  · Be sure to get enough calcium.  If you are worried that you are not getting enough, talk to your doctor. · Exercise every day, and stretch your legs before bed. · Take a warm bath before bed, and try leg warmers at night. Where can you learn more? Go to https://chnedeb.healthCensis Technologies. org and sign in to your [employer] account. Enter R820 in the [employer] box to learn more about \"Weeks 18 to 22 of Your Pregnancy: Care Instructions. \"     If you do not have an account, please click on the \"Sign Up Now\" link. Current as of: June 16, 2021               Content Version: 13.1  © 2220-4765 Healthwise, [employer]. Care instructions adapted under license by [facility] ([facility]). If you have questions about a medical condition or this instruction, always ask your healthcare professional. [address] any warranty or liability for your use of this information.

## 2022-01-14 DIAGNOSIS — J06.9 VIRAL UPPER RESPIRATORY TRACT INFECTION: Primary | ICD-10-CM

## 2022-01-14 RX ORDER — AZITHROMYCIN 250 MG/1
250 TABLET, FILM COATED ORAL SEE ADMIN INSTRUCTIONS
Qty: 6 TABLET | Refills: 0 | Status: SHIPPED | OUTPATIENT
Start: 2022-01-14 | End: 2022-01-19

## 2022-02-10 ENCOUNTER — ROUTINE PRENATAL (OUTPATIENT)
Dept: OBGYN CLINIC | Age: 22
End: 2022-02-10
Payer: MEDICAID

## 2022-02-10 VITALS
DIASTOLIC BLOOD PRESSURE: 72 MMHG | SYSTOLIC BLOOD PRESSURE: 108 MMHG | HEART RATE: 94 BPM | WEIGHT: 166 LBS | BODY MASS INDEX: 28.49 KG/M2

## 2022-02-10 DIAGNOSIS — Z34.83 ENCOUNTER FOR SUPERVISION OF OTHER NORMAL PREGNANCY IN THIRD TRIMESTER: Primary | ICD-10-CM

## 2022-02-10 DIAGNOSIS — Z3A.23 23 WEEKS GESTATION OF PREGNANCY: ICD-10-CM

## 2022-02-10 PROCEDURE — 99213 OFFICE O/P EST LOW 20 MIN: CPT | Performed by: NURSE PRACTITIONER

## 2022-02-10 NOTE — PROGRESS NOTES
Kevin Barr is here for a return obstetrical visit. Today she is 23w5d weeks EGA. She is doing well has no unusual complaints. She  does not have vaginal bleeding, leaking of fluid, contractions. She does not have blurred vision, SOB, or increased swelling in legs or face. Pt does not feel fetal movement regularly. Objective: Mother's Prenatal Vitals  BP: 108/72  Weight: 166 lb (75.3 kg)  Pulse: 94  Patient Position: Sitting  Prenatal Fetal Information  Fundal Height (cm): 25 cm  Fetal Heart Rate: US-134  Movement: Present  Pt is A&Ox3, in no acute distress. Normocephalic, atraumatic. PERRL. Resp even and non-labored. Skin pink, warm & dry. Gravid abdomen. DE LA CRUZ's well. Gait steady. Assessment:  IUP at 23w5d wks      Diagnosis Orders   1. Encounter for supervision of other normal pregnancy in third trimester  Glucose 1 Hour Postprandial    CBC   2. 23 weeks gestation of pregnancy       Plan:Pt counseled on GHTN precautions, Kick count and  labor  Continue with routine prenatal care. RTC in 4 wk for prenatal visit    MEDICATIONS:  No orders of the defined types were placed in this encounter.       ORDERS:  Orders Placed This Encounter   Procedures    Glucose 1 Hour Postprandial    CBC

## 2022-02-10 NOTE — PROGRESS NOTES
Pt presents today for routine prenatal visit. Pt denies vaginal bleeding, cramping, or leaking of fluid +fetal movement. She states she has vaginal bleeding on and off and has discussed with Dr Franklin Lanza.

## 2022-02-28 DIAGNOSIS — R82.90 ABNORMAL URINE ODOR: Primary | ICD-10-CM

## 2022-02-28 DIAGNOSIS — Z3A.26 26 WEEKS GESTATION OF PREGNANCY: ICD-10-CM

## 2022-03-08 ENCOUNTER — ROUTINE PRENATAL (OUTPATIENT)
Dept: OBGYN CLINIC | Age: 22
End: 2022-03-08
Payer: MEDICAID

## 2022-03-08 VITALS
SYSTOLIC BLOOD PRESSURE: 124 MMHG | BODY MASS INDEX: 30.04 KG/M2 | DIASTOLIC BLOOD PRESSURE: 75 MMHG | WEIGHT: 175 LBS | HEART RATE: 94 BPM

## 2022-03-08 DIAGNOSIS — Z3A.27 27 WEEKS GESTATION OF PREGNANCY: Primary | ICD-10-CM

## 2022-03-08 DIAGNOSIS — Z34.83 ENCOUNTER FOR SUPERVISION OF OTHER NORMAL PREGNANCY IN THIRD TRIMESTER: ICD-10-CM

## 2022-03-08 DIAGNOSIS — Z3A.26 26 WEEKS GESTATION OF PREGNANCY: ICD-10-CM

## 2022-03-08 DIAGNOSIS — R82.90 ABNORMAL URINE ODOR: ICD-10-CM

## 2022-03-08 LAB
BACTERIA: ABNORMAL /HPF
BILIRUBIN URINE: NEGATIVE
BLOOD, URINE: NEGATIVE
CLARITY: ABNORMAL
COLOR: YELLOW
EPITHELIAL CELLS, UA: ABNORMAL /HPF
GLUCOSE URINE: NEGATIVE MG/DL
GLUCOSE, 1HR PP: 101 MG/DL (ref 75–140)
HCT VFR BLD CALC: 28.6 % (ref 37–47)
HEMOGLOBIN: 8.7 G/DL (ref 12–16)
KETONES, URINE: ABNORMAL MG/DL
LEUKOCYTE ESTERASE, URINE: ABNORMAL
MCH RBC QN AUTO: 23.8 PG (ref 27–31)
MCHC RBC AUTO-ENTMCNC: 30.4 G/DL (ref 33–37)
MCV RBC AUTO: 78.4 FL (ref 81–99)
NITRITE, URINE: POSITIVE
PDW BLD-RTO: 14.5 % (ref 11.5–14.5)
PH UA: 5.5 (ref 5–8)
PLATELET # BLD: 284 K/UL (ref 130–400)
PMV BLD AUTO: 11.2 FL (ref 9.4–12.3)
PROTEIN UA: ABNORMAL MG/DL
RBC # BLD: 3.65 M/UL (ref 4.2–5.4)
RBC UA: ABNORMAL /HPF (ref 0–2)
SPECIFIC GRAVITY UA: 1.02 (ref 1–1.03)
UROBILINOGEN, URINE: 1 E.U./DL
WBC # BLD: 9.2 K/UL (ref 4.8–10.8)
WBC UA: ABNORMAL /HPF (ref 0–5)

## 2022-03-08 PROCEDURE — 99213 OFFICE O/P EST LOW 20 MIN: CPT | Performed by: OBSTETRICS & GYNECOLOGY

## 2022-03-08 RX ORDER — NITROFURANTOIN 25; 75 MG/1; MG/1
100 CAPSULE ORAL 2 TIMES DAILY
Qty: 20 CAPSULE | Refills: 0 | Status: SHIPPED | OUTPATIENT
Start: 2022-03-08 | End: 2022-05-02 | Stop reason: SDUPTHER

## 2022-03-08 NOTE — PATIENT INSTRUCTIONS
Patient Education        Weeks 26 to 30 of Your Pregnancy: Care Instructions  Overview     You are now entering your last trimester of pregnancy. Your baby is growing quickly. Pleas Early probably feel your baby moving around more often. Your doctor may ask you to count your baby's kicks. Your back may ache as your body gets used to your baby's size and length. If you haven't already had the Tdap shot during this pregnancy, talk to your doctor about getting it. It will help protect your  against pertussis infection. During this time, it's important to take care of yourself and pay attention to what your body needs. If you feel sexual, you can explore ways to be close with your partner that match your comfort and desire. Follow-up care is a key part of your treatment and safety. Be sure to make and go to all appointments, and call your doctor if you are having problems. It's also a good idea to know your test results and keep a list of the medicines you take. How can you care for yourself at home? Take it easy at work  · Take frequent breaks. If possible, stop working when you are tired, and rest during your lunch hour. · Take bathroom breaks every 2 hours. · Change positions often. If you sit for long periods, stand up and walk around. · When you stand for a long time, keep one foot on a low stool with your knee bent. After standing a lot, sit with your feet up. · Avoid fumes, chemicals, and tobacco smoke. Be sexual in your own way  · Having sex during pregnancy is okay, unless your doctor tells you not to. · You may be very interested in sex, or you may have no interest at all. · Your growing belly can make it hard to find a good position during intercourse. [place] and explore. · You may get cramps in your uterus when your partner touches your breasts. · A back rub may relieve the backache or cramps that sometimes follow orgasm. Learn about  labor  · Watch for signs of  labor. You may be going into labor if:  ? You have menstrual-like cramps, with or without nausea. ? You have about 6 or more contractions in 1 hour, even after you have had a glass of water and are resting. ? You have a low, dull backache that does not go away when you change your position. ? You have pain or pressure in your pelvis that comes and goes in a pattern. ? You have intestinal cramping or flu-like symptoms, with or without diarrhea.  ? You notice an increase or change in your vaginal discharge. Discharge may be heavy, mucus-like, watery, or streaked with blood. ? Your water breaks. · If you think you have  labor:  ? Drink 2 or 3 glasses of water or juice. Not drinking enough fluids can cause contractions. ? Stop what you are doing, and empty your bladder. Then lie down on your left side for at least 1 hour. ? While lying on your side, find your breast bone. Put your fingers in the soft spot just below it. Move your fingers down toward your belly button to find the top of your uterus. Check to see if it is tight. ? Contractions can be weak or strong. Record your contractions for an hour. Time a contraction from the start of one contraction to the start of the next one.  ? Single or several strong contractions without a pattern are called [place]-Martinez contractions. They are practice contractions but not the start of labor. They often stop if you change what you are doing. ? Call your doctor if you have regular contractions. Where can you learn more? Go to https://Brigadeanthony.healthDedicated Devices. org and sign in to your [employer] account. Enter N101 in the [facility] box to learn more about \"Weeks 26 to 30 of Your Pregnancy: Care Instructions. \"     If you do not have an account, please click on the \"Sign Up Now\" link. Current as of: 2021               Content Version: 13.1  © 2208-5430 Healthwise, Incorporated. Care instructions adapted under license by [facility] ([facility]).  If you have questions about a medical condition or this instruction, always ask your healthcare professional. [place] [address] any warranty or liability for your use of this information.

## 2022-03-08 NOTE — PROGRESS NOTES
I, Martha Reyes RN, am scribing for and in the presence of Dr. Lisa Cao 3/8/2022/10:56 AM/sign      Subjective:  Val Rose is here for a return obstetrical visit. Today she is 27w3d weeks EGA. She is [pain with urination. She left a urine sample in the lab for UA. She  does not have vaginal bleeding, leaking , contractions, syncope, or headaches. Pt does feel fetal movement regularly. GCT obtained today. Objective: Mother's Prenatal Vitals  BP: 124/75  Weight: 175 lb (79.4 kg)  Pulse: 94  Patient Position: Sitting  Prenatal Fetal Information  Fetal Heart Rate: 148/US  Movement: Present  Pt is A&Ox3, in no acute distress. Normocephalic, atraumatic. PERRL. Resp even and non-labored. Skin pink, warm & dry. Gravid abdomen. DE LA CRUZ's well. Gait steady. Assessment:    IUP at 27w3d wks      Diagnosis Orders   1. 27 weeks gestation of pregnancy       Plan:  Pt counseled on balanced nutrition, adequate fluid intake, taking PNV daily, and exercise along with GHTN precautions and  labor  Continue with routine prenatal care. Discussed UA results and macrobid RX   RTC in 2 wks for prenatal visit    MEDICATIONS:  Orders Placed This Encounter   Medications    nitrofurantoin, macrocrystal-monohydrate, (MACROBID) 100 MG capsule     Sig: Take 1 capsule by mouth 2 times daily for 10 days     Dispense:  20 capsule     Refill:  0     ORDERS:  No orders of the defined types were placed in this encounter. Madhavi Tomlinson MD, personally performed services described in this document as scribed by Cyrus Morgan RN in my presence, and it is both accurate and complete.

## 2022-03-08 NOTE — PROGRESS NOTES
Pt presents today for a routine prenatal visit. Pt denies vaginal bleeding, leaking of fluid or cramping. Pt states + fetal movement.   1hr GTT and 3D U/S done today, repeat 3D U/S at next visit

## 2022-03-10 LAB
ORGANISM: ABNORMAL
URINE CULTURE, ROUTINE: ABNORMAL
URINE CULTURE, ROUTINE: ABNORMAL

## 2022-03-13 ENCOUNTER — HOSPITAL ENCOUNTER (OUTPATIENT)
Age: 22
Discharge: HOME | End: 2022-03-13
Attending: NURSE PRACTITIONER | Admitting: NURSE PRACTITIONER
Payer: MEDICAID

## 2022-03-13 VITALS — DIASTOLIC BLOOD PRESSURE: 65 MMHG | HEART RATE: 84 BPM | SYSTOLIC BLOOD PRESSURE: 116 MMHG | TEMPERATURE: 98.2 F

## 2022-03-13 PROBLEM — D64.9 ANEMIA: Status: ACTIVE | Noted: 2022-03-13

## 2022-03-13 PROCEDURE — 96365 THER/PROPH/DIAG IV INF INIT: CPT

## 2022-03-13 PROCEDURE — 2580000003 HC RX 258: Performed by: NURSE PRACTITIONER

## 2022-03-13 PROCEDURE — 99211 OFF/OP EST MAY X REQ PHY/QHP: CPT

## 2022-03-13 PROCEDURE — 6360000002 HC RX W HCPCS: Performed by: NURSE PRACTITIONER

## 2022-03-13 RX ORDER — SODIUM CHLORIDE 0.9 % (FLUSH) 0.9 %
5-40 SYRINGE (ML) INJECTION EVERY 12 HOURS SCHEDULED
Status: DISCONTINUED | OUTPATIENT
Start: 2022-03-13 | End: 2022-03-13 | Stop reason: HOSPADM

## 2022-03-13 RX ORDER — ONDANSETRON 2 MG/ML
4 INJECTION INTRAMUSCULAR; INTRAVENOUS EVERY 6 HOURS PRN
Status: DISCONTINUED | OUTPATIENT
Start: 2022-03-13 | End: 2022-03-13 | Stop reason: HOSPADM

## 2022-03-13 RX ORDER — ONDANSETRON 4 MG/1
4 TABLET, ORALLY DISINTEGRATING ORAL EVERY 8 HOURS PRN
Status: DISCONTINUED | OUTPATIENT
Start: 2022-03-13 | End: 2022-03-13 | Stop reason: HOSPADM

## 2022-03-13 RX ORDER — SODIUM CHLORIDE 0.9 % (FLUSH) 0.9 %
5-40 SYRINGE (ML) INJECTION PRN
Status: DISCONTINUED | OUTPATIENT
Start: 2022-03-13 | End: 2022-03-13 | Stop reason: HOSPADM

## 2022-03-13 RX ORDER — SODIUM CHLORIDE 9 MG/ML
25 INJECTION, SOLUTION INTRAVENOUS PRN
Status: DISCONTINUED | OUTPATIENT
Start: 2022-03-13 | End: 2022-03-13 | Stop reason: HOSPADM

## 2022-03-13 RX ORDER — ACETAMINOPHEN 325 MG/1
650 TABLET ORAL EVERY 4 HOURS PRN
Status: DISCONTINUED | OUTPATIENT
Start: 2022-03-13 | End: 2022-03-13 | Stop reason: HOSPADM

## 2022-03-13 RX ADMIN — FERRIC CARBOXYMALTOSE INJECTION 750 MG: 50 INJECTION, SOLUTION INTRAVENOUS at 14:52

## 2022-03-13 NOTE — FLOWSHEET NOTE
Pt ambulated to L&D for iron infusion. Pt reports +FM. Pt denies lof, vaginal bleeding or ctx. efm and toco applied to soft non tender abdomen, fht 145 with mod variabilty. Iv started in right forearm x 1 attempt. Will monitor closely. Call light and family in reach.

## 2022-03-13 NOTE — FLOWSHEET NOTE
efm and toco removed from soft non tender abdomen, fht 135 with mod variabilty, accels noted. No decels. No ctx. Iv d/c and pt ambulated from L&D.

## 2022-03-22 ENCOUNTER — ROUTINE PRENATAL (OUTPATIENT)
Dept: OBGYN CLINIC | Age: 22
End: 2022-03-22
Payer: MEDICAID

## 2022-03-22 ENCOUNTER — HOSPITAL ENCOUNTER (OUTPATIENT)
Dept: LABOR AND DELIVERY | Age: 22
Discharge: HOME | End: 2022-03-22
Attending: OBSTETRICS & GYNECOLOGY | Admitting: OBSTETRICS & GYNECOLOGY
Payer: MEDICAID

## 2022-03-22 VITALS
SYSTOLIC BLOOD PRESSURE: 120 MMHG | HEART RATE: 106 BPM | WEIGHT: 171 LBS | DIASTOLIC BLOOD PRESSURE: 79 MMHG | BODY MASS INDEX: 29.35 KG/M2

## 2022-03-22 DIAGNOSIS — Z3A.29 29 WEEKS GESTATION OF PREGNANCY: ICD-10-CM

## 2022-03-22 DIAGNOSIS — O99.013 ANEMIA COMPLICATING PREGNANCY IN THIRD TRIMESTER: ICD-10-CM

## 2022-03-22 DIAGNOSIS — Z34.83 ENCOUNTER FOR SUPERVISION OF OTHER NORMAL PREGNANCY IN THIRD TRIMESTER: Primary | ICD-10-CM

## 2022-03-22 PROCEDURE — 6360000002 HC RX W HCPCS: Performed by: OBSTETRICS & GYNECOLOGY

## 2022-03-22 PROCEDURE — 99213 OFFICE O/P EST LOW 20 MIN: CPT | Performed by: NURSE PRACTITIONER

## 2022-03-22 PROCEDURE — 99211 OFF/OP EST MAY X REQ PHY/QHP: CPT

## 2022-03-22 PROCEDURE — 96365 THER/PROPH/DIAG IV INF INIT: CPT

## 2022-03-22 PROCEDURE — 2580000003 HC RX 258: Performed by: OBSTETRICS & GYNECOLOGY

## 2022-03-22 RX ORDER — FERROUS SULFATE 325(65) MG
325 TABLET ORAL 2 TIMES DAILY
Qty: 60 TABLET | Refills: 5 | Status: SHIPPED | OUTPATIENT
Start: 2022-03-22 | End: 2022-06-08

## 2022-03-22 RX ADMIN — FERRIC CARBOXYMALTOSE INJECTION 750 MG: 50 INJECTION, SOLUTION INTRAVENOUS at 10:51

## 2022-03-22 NOTE — PATIENT INSTRUCTIONS
Patient Education        Iron Deficiency Anemia: Care Instructions  Your Care Instructions     Anemia means that you don't have enough red blood cells. Red blood cells carry oxygen around your body. When you have anemia, it can make you pale, weak, and tired. Many things can cause anemia. The most common cause is loss of blood. This can happen if you have heavy menstrual periods. It can also happen if you have bleeding in your stomach or bowel. You can also get anemia if you don't have enough iron in your diet or if it's hard for your body to absorb iron. In some cases, pregnancy causes anemia. That's because a pregnant woman needs more iron. Your doctor may do more tests to find the cause of your anemia. If a disease or other health problem is causing it, your doctor will treat that problem. It's important to follow up with your doctor to make sure that your iron level returns to normal.  Follow-up care is a key part of your treatment and safety. Be sure to make and go to all appointments, and call your doctor if you are having problems. It's also a good idea to know your test results and keep a list of the medicines you take. How can you care for yourself at home? · If your doctor recommended iron pills, take them as directed. ? Try to take the pills on an empty stomach. You can do this about 1 hour before or 2 hours after meals. But you may need to take iron with food to avoid an upset stomach. ? Do not take antacids or drink milk or anything with caffeine within 2 hours of when you take your iron. They can keep your body from absorbing the iron well. ? Vitamin C helps your body absorb iron. You may want to take iron pills with a glass of orange juice or some other food high in vitamin C.  ? Iron pills may cause stomach problems. These include heartburn, nausea, diarrhea, constipation, and cramps. It can help to drink plenty of fluids and include fruits, vegetables, and fiber in your diet.   ? It's normal for iron pills to make your stool a greenish or grayish black. But internal bleeding can also cause dark stool. So it's important to tell your doctor about any color changes. ? Call your doctor if you think you are having a problem with your iron pills. Even after you start to feel better, it will take several months for your body to build up its supply of iron. ? If you miss a pill, don't take a double dose. ? Keep iron pills out of the reach of small children. Too much iron can be very dangerous. · Eat foods with a lot of iron. These include red meat, shellfish, poultry, and eggs. They also include beans, raisins, whole-grain bread, and leafy green vegetables. · Steam your vegetables. This is the best way to prepare them if you want to get as much iron as possible. · Be safe with medicines. Do not take nonsteroidal anti-inflammatory pain relievers unless your doctor tells you to. These include aspirin, naproxen (Aleve), and ibuprofen (Advil, Motrin). · Liquid iron can stain your teeth. But you can mix it with water or juice and drink it with a straw. Then it won't get on your teeth. When should you call for help? Call 911 anytime you think you may need emergency care. For example, call if:    · You passed out (lost consciousness). Call your doctor now or seek immediate medical care if:    · You are short of breath.     · You are dizzy or light-headed, or you feel like you may faint.     · You have new or worse bleeding. Watch closely for changes in your health, and be sure to contact your doctor if:    · You feel weaker or more tired than usual.     · You do not get better as expected. Where can you learn more? Go to [email or web address].[email or web address] org and sign in to your [employer] account. Enter R802 in the Prysm box to learn more about \"Iron Deficiency Anemia: Care Instructions. \"     If you do not have an account, please click on the \"Sign Up Now\" link.   Current as of: April 29, 2021               Content Version: 13.1  © 7357-2474 [employer]. Care instructions adapted under license by [employer]. If you have questions about a medical condition or this instruction, always ask your healthcare professional. [address] any warranty or liability for your use of this information.

## 2022-03-22 NOTE — PROGRESS NOTES
Sixto Almodovar is here for a return obstetrical visit. Today she is 29w3d weeks EGA. She is doing well has no unusual complaints. She  does not have vaginal bleeding, leaking of fluid, contractions. She does not have blurred vision, SOB, or increased swelling in legs or face. Pt does feel fetal movement regularly. Objective: Mother's Prenatal Vitals  BP: 120/79  Weight: 171 lb (77.6 kg)  Pulse: 106  Patient Position: Sitting  Prenatal Fetal Information  Fundal Height (cm): 26 cm  Fetal Heart Rate: US-  Movement: Present  Pt is A&Ox3, in no acute distress. Normocephalic, atraumatic. PERRL. Resp even and non-labored. Skin pink, warm & dry. Gravid abdomen. DE LA CRUZ's well. Gait steady. Assessment:  IUP at 29w3d wks      Diagnosis Orders   1. Encounter for supervision of other normal pregnancy in third trimester     2. 29 weeks gestation of pregnancy     3. Anemia complicating pregnancy in third trimester       Plan:Pt counseled on GHTN precautions, Kick count and  labor  Continue with routine prenatal care. Sending to [facility] for second Injectafer then can start po iron, sent to pharmacy  RTC in 2 wk for prenatal visit    MEDICATIONS:  Orders Placed This Encounter   Medications    ferrous sulfate (IRON 325) 325 (65 Fe) MG tablet     Sig: Take 1 tablet by mouth 2 times daily     Dispense:  60 tablet     Refill:  5       ORDERS:  No orders of the defined types were placed in this encounter.

## 2022-03-22 NOTE — FLOWSHEET NOTE
Discharge teaching reviewed. All questions answered. Follow up appointments made. Pt leaving unit ambulatoryr.

## 2022-04-07 ENCOUNTER — ROUTINE PRENATAL (OUTPATIENT)
Dept: OBGYN CLINIC | Age: 22
End: 2022-04-07
Payer: MEDICAID

## 2022-04-07 VITALS
HEART RATE: 102 BPM | BODY MASS INDEX: 29.52 KG/M2 | DIASTOLIC BLOOD PRESSURE: 88 MMHG | WEIGHT: 172 LBS | SYSTOLIC BLOOD PRESSURE: 128 MMHG

## 2022-04-07 DIAGNOSIS — O99.019 IRON DEFICIENCY ANEMIA OF MOTHER DURING PREGNANCY: ICD-10-CM

## 2022-04-07 DIAGNOSIS — Z3A.31 31 WEEKS GESTATION OF PREGNANCY: ICD-10-CM

## 2022-04-07 DIAGNOSIS — D50.9 IRON DEFICIENCY ANEMIA OF MOTHER DURING PREGNANCY: ICD-10-CM

## 2022-04-07 DIAGNOSIS — Z34.83 ENCOUNTER FOR SUPERVISION OF OTHER NORMAL PREGNANCY IN THIRD TRIMESTER: Primary | ICD-10-CM

## 2022-04-07 PROCEDURE — 99213 OFFICE O/P EST LOW 20 MIN: CPT | Performed by: NURSE PRACTITIONER

## 2022-04-07 NOTE — PROGRESS NOTES
Marta Martinez is here for a return obstetrical visit. Today she is 31w5d weeks EGA. She is doing well has no unusual complaints. She  does not havedoes not have vaginal bleeding, leaking of fluid, contractions. She does not have blurred vision, SOB, or increased swelling in legs or face. Pt does feel fetal movement regularly. Objective: Mother's Prenatal Vitals  BP: 128/88  Weight: 172 lb (78 kg)  Pulse: 102  Patient Position: Sitting  Prenatal Fetal Information  Fetal Heart Rate: US-  Movement: Present  Pt is A&Ox3, in no acute distress. Normocephalic, atraumatic. PERRL. Resp even and non-labored. Skin pink, warm & dry. Gravid abdomen. DE LA CRUZ's well. Gait steady. Assessment:  IUP at 31w5d wks      Diagnosis Orders   1. Encounter for supervision of other normal pregnancy in third trimester     2. 31 weeks gestation of pregnancy     3. Iron deficiency anemia of mother during pregnancy       Plan:Pt counseled on GHTN precautions, Kick count and  labor  Continue with routine prenatal care. Had INjectafer x2, starting po iron  RTC in 4 wk for prenatal visit    MEDICATIONS:  No orders of the defined types were placed in this encounter. ORDERS:  No orders of the defined types were placed in this encounter.

## 2022-04-07 NOTE — PATIENT INSTRUCTIONS
Patient Education        Iron Deficiency Anemia: Care Instructions  Your Care Instructions     Anemia means that you don't have enough red blood cells. Red blood cells carry oxygen around your body. When you have anemia, it can make you pale, weak, andtired. Many things can cause anemia. The most common cause is loss of blood. This can happen if you have heavy menstrual periods. It can also happen if you havebleeding in your stomach or bowel. You can also get anemia if you don't have enough iron in your diet or if it's hard for your body to absorb iron. In some cases, pregnancy causes anemia. That's because a pregnant woman needs more iron. Your doctor may do more tests to find the cause of your anemia. If a disease orother health problem is causing it, your doctor will treat that problem. It's important to follow up with your doctor to make sure that your iron levelreturns to normal.  Follow-up care is a key part of your treatment and safety. Be sure to make and go to all appointments, and call your doctor if you are having problems. It's also a good idea to know your test results and keep alist of the medicines you take. How can you care for yourself at home?  If your doctor recommended iron pills, take them as directed. ? Try to take the pills on an empty stomach. You can do this about 1 hour before or 2 hours after meals. But you may need to take iron with food to avoid an upset stomach. ? Do not take antacids or drink milk or anything with caffeine within 2 hours of when you take your iron. They can keep your body from absorbing the iron well. ? Vitamin C helps your body absorb iron. You may want to take iron pills with a glass of orange juice or some other food high in vitamin C.  ? Iron pills may cause stomach problems. These include heartburn, nausea, diarrhea, constipation, and cramps. It can help to drink plenty of fluids and include fruits, vegetables, and fiber in your diet.   ? It's normal for iron pills to make your stool a greenish or grayish black. But internal bleeding can also cause dark stool. So it's important to tell your doctor about any color changes. ? Call your doctor if you think you are having a problem with your iron pills. Even after you start to feel better, it will take several months for your body to build up its supply of iron. ? If you miss a pill, don't take a double dose. ? Keep iron pills out of the reach of small children. Too much iron can be very dangerous.  Eat foods with a lot of iron. These include red meat, shellfish, poultry, and eggs. They also include beans, raisins, whole-grain bread, and leafy green vegetables.  Steam your vegetables. This is the best way to prepare them if you want to get as much iron as possible.  Be safe with medicines. Do not take nonsteroidal anti-inflammatory pain relievers unless your doctor tells you to. These include aspirin, naproxen (Aleve), and ibuprofen (Advil, Motrin).  Liquid iron can stain your teeth. But you can mix it with water or juice and drink it with a straw. Then it won't get on your teeth. When should you call for help? Call 911 anytime you think you may need emergency care. For example, call if:     You passed out (lost consciousness). Call your doctor now or seek immediate medical care if:     You are short of breath.      You are dizzy or light-headed, or you feel like you may faint.      You have new or worse bleeding. Watch closely for changes in your health, and be sure to contact your doctor if:     You feel weaker or more tired than usual.      You do not get better as expected. Where can you learn more? Go to [email or web address]://Philo Mediaanthony.[employer]. org and sign in to your [employer] account. Enter N379 in the OpenROV box to learn more about \"Iron Deficiency Anemia: Care Instructions. \"     If you do not have an account, please click on the \"Sign Up Now\" link.   Current as of: November 29, 2021               Content Version: 13.2  © 8361-4983 Healthwise[employer] Incorporated. Care instructions adapted under license by [facility] ([facility]). If you have questions about a medical condition or this instruction, always ask your healthcare professional. [address] any warranty or liability for your use of this information.

## 2022-04-12 DIAGNOSIS — Z34.83 ENCOUNTER FOR SUPERVISION OF OTHER NORMAL PREGNANCY IN THIRD TRIMESTER: Primary | ICD-10-CM

## 2022-04-12 DIAGNOSIS — R82.90 ABNORMAL URINE ODOR: ICD-10-CM

## 2022-04-19 ENCOUNTER — ROUTINE PRENATAL (OUTPATIENT)
Dept: OBGYN CLINIC | Age: 22
End: 2022-04-19
Payer: MEDICAID

## 2022-04-19 VITALS
HEART RATE: 98 BPM | DIASTOLIC BLOOD PRESSURE: 71 MMHG | SYSTOLIC BLOOD PRESSURE: 125 MMHG | BODY MASS INDEX: 30.55 KG/M2 | WEIGHT: 178 LBS

## 2022-04-19 DIAGNOSIS — Z34.83 ENCOUNTER FOR SUPERVISION OF OTHER NORMAL PREGNANCY IN THIRD TRIMESTER: ICD-10-CM

## 2022-04-19 DIAGNOSIS — R82.90 ABNORMAL URINE ODOR: ICD-10-CM

## 2022-04-19 DIAGNOSIS — Z34.83 ENCOUNTER FOR SUPERVISION OF OTHER NORMAL PREGNANCY IN THIRD TRIMESTER: Primary | ICD-10-CM

## 2022-04-19 LAB
BACTERIA: ABNORMAL /HPF
BILIRUBIN URINE: NEGATIVE
BLOOD, URINE: ABNORMAL
CLARITY: CLEAR
COLOR: YELLOW
EPITHELIAL CELLS, UA: 1 /HPF (ref 0–5)
GLUCOSE URINE: NEGATIVE MG/DL
HYALINE CASTS: 3 /HPF (ref 0–8)
KETONES, URINE: NEGATIVE MG/DL
LEUKOCYTE ESTERASE, URINE: ABNORMAL
NITRITE, URINE: NEGATIVE
PH UA: 6 (ref 5–8)
PROTEIN UA: NEGATIVE MG/DL
RBC UA: 4 /HPF (ref 0–4)
SPECIFIC GRAVITY UA: >=1.03 (ref 1–1.03)
UROBILINOGEN, URINE: 0.2 E.U./DL
WBC UA: 97 /HPF (ref 0–5)

## 2022-04-19 PROCEDURE — 99213 OFFICE O/P EST LOW 20 MIN: CPT | Performed by: OBSTETRICS & GYNECOLOGY

## 2022-04-19 RX ORDER — CEPHALEXIN 500 MG/1
500 CAPSULE ORAL 4 TIMES DAILY
Qty: 20 CAPSULE | Refills: 0 | Status: SHIPPED | OUTPATIENT
Start: 2022-04-19 | End: 2022-04-24

## 2022-04-19 NOTE — PROGRESS NOTES
Pt presents today for a routine prenatal visit. Pt denies vaginal bleeding, leaking of fluid or cramping. Pt states + fetal movement.   Repeat 3D U/S done today

## 2022-04-19 NOTE — PATIENT INSTRUCTIONS
Patient Education        Weeks 32 to 29 of Your Pregnancy: Care Instructions  Overview     During the last few weeks of your pregnancy, you may have more aches and pains. It's important to rest when you can. Your growing baby is putting more pressure on your bladder. So you may need to urinate more often. Hemorrhoids are also common. These are painful, itchy veinsin the rectal area. You may want to talk with your doctor about banking your baby's umbilical cord blood. This is the blood left in the cord after birth. If you want to save thisblood, you must arrange it ahead of time. You can't decide at the last minute. If you haven't already had the Tdap shot during this pregnancy, talk to your doctor about getting it. It will help protect your  against pertussisinfection. Follow-up care is a key part of your treatment and safety. Be sure to make and go to all appointments, and call your doctor if you are having problems. It's also a good idea to know your test results and keep alist of the medicines you take. How can you care for yourself at home? Ease hemorrhoids   Get more liquids, fruits, vegetables, and fiber in your diet. This will help keep your stools soft.  Avoid sitting for too long. Lie on your left side several times a day.  Clean yourself with soft, moist toilet paper. Or you can use witch hazel pads or personal hygiene pads.  If you are uncomfortable, try ice packs. Or you can sit in a warm sitz bath. Do these for 20 minutes at a time, as needed.  Use hydrocortisone cream for pain and itching. Two examples are Anusol and Preparation H Hydrocortisone.  Ask your doctor about taking an over-the-counter stool softener. Consider breastfeeding   Experts recommend breastfeeding for 1 year or longer.  Breast milk may help protect your child from some health problems.  babies are less likely than formula-fed babies to:  ? Get ear infections, colds, diarrhea, and pneumonia. ?  Be obese or get diabetes later in life.  Breastfeeding causes the release of a hormone called oxytocin. This hormone may help your uterus shrink back faster.  Breastfeeding may help you lose weight faster. Making milk burns calories.  Breastfeeding can lower your risk of breast cancer, ovarian cancer, and osteoporosis. Decide about circumcision for your baby   As you make this decision, it may help to think about your personal, [employer], and family traditions. You get to decide if you will keep your baby's penis natural or if your baby will be circumcised.  If you decide that you would like to have your baby circumcised, talk with your doctor. You can share your concerns about pain. And you can discuss your preferences for anesthesia. Where can you learn more? Go to [email or web address]://[email or web address] [email or web address].[email or web address] org and sign in to your [employer] account. Enter Z771 in the Famigo box to learn more about \"Weeks 32 to 34 of Your Pregnancy: Care Instructions. \"     If you do not have an account, please click on the \"Sign Up Now\" link. Current as of: June 16, 2021               Content Version: 13.2  © 7145-1514 Healthwise, Incorporated. Care instructions adapted under license by [facility] ([facility]). If you have questions about a medical condition or this instruction, always ask your healthcare professional. [address] any warranty or liability for your use of this information.

## 2022-04-19 NOTE — PROGRESS NOTES
I, Martha Reyes RN, am scribing for and in the presence of Dr. Maynor Pastor :52 AM/sign      Subjective:  Cristin Maza is here for a return obstetrical visit. Today she is 33w3d weeks EGA. She is still having an abnormal smell to her urine. She completed the [employer], repeated UA and CX today. She  does not have vaginal bleeding, leaking, contractions, syncope, or headaches. Pt does feel fetal movement regularly. Objective: Mother's Prenatal Vitals  BP: 125/71  Weight: 178 lb (80.7 kg)  Pulse: 98  Patient Position: Sitting  Prenatal Fetal Information  Fundal Height (cm): 35 cm  Movement: Present  Pt is A&Ox3, in no acute distress. Normocephalic, atraumatic. PERRL. Resp even and non-labored. Skin pink, warm & dry. Gravid abdomen. DE LA CRUZ's well. Gait steady. Assessment:    IUP at 33w3d wks      Diagnosis Orders   1. Encounter for supervision of other normal pregnancy in third trimester     2. Abnormal urine odor       Plan:  Pt counseled on balanced nutrition, adequate fluid intake, taking PNV daily, and exercise along with GHTN precautions, Kick count and  labor  Continue with routine prenatal care. Reviewed UA results and pt will start keflex    surveillance not indicated  RTC in 2 wks for prenatal visit     MEDICATIONS:  Orders Placed This Encounter   Medications    cephALEXin (KEFLEX) 500 MG capsule     Sig: Take 1 capsule by mouth 4 times daily for 5 days     Dispense:  20 capsule     Refill:  0     ORDERS:  No orders of the defined types were placed in this encounter. Michael Zapata MD, personally performed services described in this document as scribed by Caitie Sheets RN in my presence, and it is both accurate and complete.

## 2022-04-21 LAB
ORGANISM: ABNORMAL
URINE CULTURE, ROUTINE: ABNORMAL
URINE CULTURE, ROUTINE: ABNORMAL

## 2022-04-28 DIAGNOSIS — L29.9 ITCHING: ICD-10-CM

## 2022-04-28 DIAGNOSIS — R30.9 PAIN WITH URINATION: Primary | ICD-10-CM

## 2022-05-02 DIAGNOSIS — R30.9 PAIN WITH URINATION: ICD-10-CM

## 2022-05-02 DIAGNOSIS — L29.9 ITCHING: ICD-10-CM

## 2022-05-02 LAB
ALBUMIN SERPL-MCNC: 3.2 G/DL (ref 3.5–5.2)
ALP BLD-CCNC: 87 U/L (ref 35–104)
ALT SERPL-CCNC: 8 U/L (ref 5–33)
ANION GAP SERPL CALCULATED.3IONS-SCNC: 10 MMOL/L (ref 7–19)
AST SERPL-CCNC: 12 U/L (ref 5–32)
BACTERIA: ABNORMAL /HPF
BASOPHILS ABSOLUTE: 0 K/UL (ref 0–0.2)
BASOPHILS RELATIVE PERCENT: 0.3 % (ref 0–1)
BILIRUB SERPL-MCNC: <0.2 MG/DL (ref 0.2–1.2)
BILIRUBIN URINE: NEGATIVE
BLOOD, URINE: ABNORMAL
BUN BLDV-MCNC: 4 MG/DL (ref 6–20)
CALCIUM SERPL-MCNC: 8.2 MG/DL (ref 8.6–10)
CHLORIDE BLD-SCNC: 104 MMOL/L (ref 98–111)
CLARITY: ABNORMAL
CO2: 24 MMOL/L (ref 22–29)
COLOR: ABNORMAL
CREAT SERPL-MCNC: 0.4 MG/DL (ref 0.5–0.9)
CRYSTALS, UA: ABNORMAL /HPF
EOSINOPHILS ABSOLUTE: 0.1 K/UL (ref 0–0.6)
EOSINOPHILS RELATIVE PERCENT: 1.3 % (ref 0–5)
EPITHELIAL CELLS, UA: ABNORMAL /HPF
GFR AFRICAN AMERICAN: >59
GFR NON-AFRICAN AMERICAN: >60
GLUCOSE BLD-MCNC: 106 MG/DL (ref 74–109)
GLUCOSE URINE: 100 MG/DL
HCT VFR BLD CALC: 38 % (ref 37–47)
HEMOGLOBIN: 11.9 G/DL (ref 12–16)
IMMATURE GRANULOCYTES #: 0 K/UL
KETONES, URINE: ABNORMAL MG/DL
LEUKOCYTE ESTERASE, URINE: ABNORMAL
LYMPHOCYTES ABSOLUTE: 1.7 K/UL (ref 1.1–4.5)
LYMPHOCYTES RELATIVE PERCENT: 21.9 % (ref 20–40)
MCH RBC QN AUTO: 27.2 PG (ref 27–31)
MCHC RBC AUTO-ENTMCNC: 31.3 G/DL (ref 33–37)
MCV RBC AUTO: 87 FL (ref 81–99)
MONOCYTES ABSOLUTE: 0.4 K/UL (ref 0–0.9)
MONOCYTES RELATIVE PERCENT: 4.8 % (ref 0–10)
NEUTROPHILS ABSOLUTE: 5.6 K/UL (ref 1.5–7.5)
NEUTROPHILS RELATIVE PERCENT: 71.6 % (ref 50–65)
NITRITE, URINE: POSITIVE
PDW BLD-RTO: 21.3 % (ref 11.5–14.5)
PH UA: 5.5 (ref 5–8)
PLATELET # BLD: 180 K/UL (ref 130–400)
PMV BLD AUTO: 11.1 FL (ref 9.4–12.3)
POTASSIUM SERPL-SCNC: 3.8 MMOL/L (ref 3.5–5)
PROTEIN UA: 30 MG/DL
RBC # BLD: 4.37 M/UL (ref 4.2–5.4)
RBC UA: ABNORMAL /HPF (ref 0–2)
SODIUM BLD-SCNC: 138 MMOL/L (ref 136–145)
SPECIFIC GRAVITY UA: 1.02 (ref 1–1.03)
TOTAL PROTEIN: 5.8 G/DL (ref 6.6–8.7)
UROBILINOGEN, URINE: 1 E.U./DL
WBC # BLD: 7.8 K/UL (ref 4.8–10.8)
WBC UA: ABNORMAL /HPF (ref 0–5)

## 2022-05-02 RX ORDER — NITROFURANTOIN 25; 75 MG/1; MG/1
100 CAPSULE ORAL 2 TIMES DAILY
Qty: 20 CAPSULE | Refills: 0 | Status: ON HOLD | OUTPATIENT
Start: 2022-05-02 | End: 2022-05-12

## 2022-05-03 LAB — BILE ACIDS TOTAL: 5 UMOL/L (ref 0–10)

## 2022-05-04 ENCOUNTER — ROUTINE PRENATAL (OUTPATIENT)
Dept: OBGYN CLINIC | Age: 22
End: 2022-05-04
Payer: MEDICAID

## 2022-05-04 ENCOUNTER — TELEPHONE (OUTPATIENT)
Dept: OBGYN CLINIC | Age: 22
End: 2022-05-04

## 2022-05-04 VITALS
SYSTOLIC BLOOD PRESSURE: 111 MMHG | BODY MASS INDEX: 30.73 KG/M2 | HEART RATE: 106 BPM | DIASTOLIC BLOOD PRESSURE: 78 MMHG | WEIGHT: 179 LBS

## 2022-05-04 DIAGNOSIS — O23.43 RECURRENT URINARY TRACT INFECTION AFFECTING PREGNANCY IN THIRD TRIMESTER: ICD-10-CM

## 2022-05-04 DIAGNOSIS — Z34.83 ENCOUNTER FOR SUPERVISION OF OTHER NORMAL PREGNANCY IN THIRD TRIMESTER: Primary | ICD-10-CM

## 2022-05-04 DIAGNOSIS — Z3A.35 35 WEEKS GESTATION OF PREGNANCY: ICD-10-CM

## 2022-05-04 LAB
ORGANISM: ABNORMAL
URINE CULTURE, ROUTINE: ABNORMAL
URINE CULTURE, ROUTINE: ABNORMAL

## 2022-05-04 PROCEDURE — 99213 OFFICE O/P EST LOW 20 MIN: CPT | Performed by: OBSTETRICS & GYNECOLOGY

## 2022-05-04 RX ORDER — CEFIXIME 400 MG/1
400 CAPSULE ORAL DAILY
Qty: 10 CAPSULE | Refills: 0 | Status: SHIPPED | OUTPATIENT
Start: 2022-05-04 | End: 2022-05-14

## 2022-05-04 NOTE — TELEPHONE ENCOUNTER
Pharmacy left msg stating generic Suprax that was called they cant get generic nor name brand. Is there something else that can be sent in in it's place?

## 2022-05-04 NOTE — PROGRESS NOTES
Martha CONNELLY RN, am scribing for and in the presence of Dr. Karina Kelley 2022/10:06 AM/sign      Subjective:  Sumanth Sim is here for a return obstetrical visit. Today she is 35w4d weeks EGA. She is still having UTI symptoms. Having these symptoms since 3/2022. She has taken Macrobid, Keflex ,and currently on Macrobid again. She is having positive urine cultures. She  does not have vaginal bleeding, leaking, contractions, syncope, or headaches. Pt does feel fetal movement regularly. Objective: Mother's Prenatal Vitals  BP: 111/78  Weight: 179 lb (81.2 kg)  Pulse: 106  Patient Position: Sitting  Prenatal Fetal Information  Movement: Present  Cervical Exam  Dilation (cm): Closed  Presentation: Vertex  Dil/Eff/Sta  Dilation (cm): Closed  Pt is A&Ox3, in no acute distress. Normocephalic, atraumatic. PERRL. Resp even and non-labored. Skin pink, warm & dry. Gravid abdomen. DE LA CRUZ's well. Gait steady. GBS obtained   Assessment:    IUP at 35w4d wks      Diagnosis Orders   1. Encounter for supervision of other normal pregnancy in third trimester     2. 35 weeks gestation of pregnancy  Culture, Strep B Screen, Vaginal/Rectal    cefixime (SUPRAX) 400 MG CAPS capsule   3. Recurrent urinary tract infection affecting pregnancy in third trimester  cefixime (SUPRAX) 400 MG CAPS capsule     Plan:  Pt counseled on balanced nutrition, adequate fluid intake, taking PNV daily, and exercise along with GHTN precautions, Kick count and  labor  Continue with routine prenatal care. Pt will stop macrobid and complete a round of Suprax. Once Suprax completed then will start daily Macrobid until delivery.    RTC in 1 wks for prenatal visit    MEDICATIONS:  Orders Placed This Encounter   Medications    cefixime (SUPRAX) 400 MG CAPS capsule     Sig: Take 1 capsule by mouth daily for 10 days     Dispense:  10 capsule     Refill:  0     ORDERS:  Orders Placed This Encounter   Procedures    Culture, Strep B Screen, Emily Carson MD, personally performed services described in this document as scribed by Micah Starkey RN in my presence, and it is both accurate and complete.

## 2022-05-05 LAB — STREP B DNA AMP: NOT DETECTED

## 2022-05-11 ENCOUNTER — ROUTINE PRENATAL (OUTPATIENT)
Dept: OBGYN CLINIC | Age: 22
End: 2022-05-11
Payer: MEDICAID

## 2022-05-11 VITALS
BODY MASS INDEX: 31.24 KG/M2 | HEART RATE: 83 BPM | SYSTOLIC BLOOD PRESSURE: 114 MMHG | WEIGHT: 182 LBS | DIASTOLIC BLOOD PRESSURE: 84 MMHG

## 2022-05-11 DIAGNOSIS — Z34.83 ENCOUNTER FOR SUPERVISION OF OTHER NORMAL PREGNANCY IN THIRD TRIMESTER: Primary | ICD-10-CM

## 2022-05-11 PROCEDURE — 99213 OFFICE O/P EST LOW 20 MIN: CPT | Performed by: OBSTETRICS & GYNECOLOGY

## 2022-05-11 NOTE — PATIENT INSTRUCTIONS

## 2022-05-11 NOTE — PROGRESS NOTES
I, Martha Reyes RN, am scribing for and in the presence of Dr. Renate Toth 2022/10:53 AM/sign      Subjective:  Sara Lindsay is here for a return obstetrical visit. Today she is 36w4d weeks EGA. She is doing well, taking her PNV as directed, and has no complaints. She  does not have vaginal bleeding, leaking, contractions, syncope, or headaches. Pt does feel fetal movement regularly. Objective: Mother's Prenatal Vitals  BP: 114/84  Weight: 182 lb (82.6 kg)  Pulse: 83  Patient Position: Sitting  Prenatal Fetal Information  Fundal Height (cm): 36 cm  Fetal Heart Rate: 166  Movement: Present  Cervical Exam  Presentation: Vertex  Pt is A&Ox3, in no acute distress. Normocephalic, atraumatic. PERRL. Resp even and non-labored. Skin pink, warm & dry. Gravid abdomen. DE LA CRUZ's well. Gait steady. Assessment:    IUP at 36w4d wks      Diagnosis Orders   1. Encounter for supervision of other normal pregnancy in third trimester       Plan:  Pt counseled on balanced nutrition, adequate fluid intake, taking PNV daily, and exercise along with labor precautions, GHTN precautions and Kick count  Continue with routine prenatal care.  surveillance not indicated  Will plan on IOL at 39 wks  RTC in 1 wks for prenatal visit    MEDICATIONS:  No orders of the defined types were placed in this encounter. ORDERS:  No orders of the defined types were placed in this encounter. Viola Gomez MD, personally performed services described in this document as scribed by Leda Stevens RN in my presence, and it is both accurate and complete.

## 2022-05-12 ENCOUNTER — HOSPITAL ENCOUNTER (OUTPATIENT)
Age: 22
Discharge: HOME | End: 2022-05-12
Attending: OBSTETRICS & GYNECOLOGY | Admitting: OBSTETRICS & GYNECOLOGY
Payer: MEDICAID

## 2022-05-12 VITALS
BODY MASS INDEX: 30.29 KG/M2 | HEART RATE: 106 BPM | SYSTOLIC BLOOD PRESSURE: 107 MMHG | HEIGHT: 65 IN | TEMPERATURE: 98 F | DIASTOLIC BLOOD PRESSURE: 70 MMHG | RESPIRATION RATE: 14 BRPM

## 2022-05-12 PROBLEM — Z3A.36 36 WEEKS GESTATION OF PREGNANCY: Status: ACTIVE | Noted: 2022-05-12

## 2022-05-12 PROCEDURE — 99212 OFFICE O/P EST SF 10 MIN: CPT

## 2022-05-12 RX ORDER — ONDANSETRON 4 MG/1
4 TABLET, ORALLY DISINTEGRATING ORAL EVERY 8 HOURS PRN
Status: DISCONTINUED | OUTPATIENT
Start: 2022-05-12 | End: 2022-05-13 | Stop reason: HOSPADM

## 2022-05-12 RX ORDER — ONDANSETRON 4 MG/1
4 TABLET, ORALLY DISINTEGRATING ORAL EVERY 8 HOURS PRN
Qty: 30 TABLET | Refills: 1 | Status: ON HOLD | OUTPATIENT
Start: 2022-05-12 | End: 2022-05-29 | Stop reason: HOSPADM

## 2022-05-12 RX ORDER — ONDANSETRON 2 MG/ML
4 INJECTION INTRAMUSCULAR; INTRAVENOUS EVERY 6 HOURS PRN
Status: DISCONTINUED | OUTPATIENT
Start: 2022-05-12 | End: 2022-05-13 | Stop reason: HOSPADM

## 2022-05-12 RX ORDER — ACETAMINOPHEN 325 MG/1
650 TABLET ORAL EVERY 4 HOURS PRN
Status: DISCONTINUED | OUTPATIENT
Start: 2022-05-12 | End: 2022-05-13 | Stop reason: HOSPADM

## 2022-05-13 NOTE — PROGRESS NOTES
Admitted to L&D for decreased fetal movement since 12:30. Monitoring initiated.  Vitals obtained and wnl

## 2022-05-17 ENCOUNTER — ROUTINE PRENATAL (OUTPATIENT)
Dept: OBGYN CLINIC | Age: 22
End: 2022-05-17
Payer: MEDICAID

## 2022-05-17 VITALS
SYSTOLIC BLOOD PRESSURE: 114 MMHG | BODY MASS INDEX: 30.29 KG/M2 | WEIGHT: 182 LBS | DIASTOLIC BLOOD PRESSURE: 70 MMHG | HEART RATE: 94 BPM

## 2022-05-17 DIAGNOSIS — Z34.83 ENCOUNTER FOR SUPERVISION OF OTHER NORMAL PREGNANCY IN THIRD TRIMESTER: Primary | ICD-10-CM

## 2022-05-17 PROCEDURE — 99213 OFFICE O/P EST LOW 20 MIN: CPT | Performed by: OBSTETRICS & GYNECOLOGY

## 2022-05-17 NOTE — PROGRESS NOTES
I, Martha eRyes RN, am scribing for and in the presence of Dr. Julia Fraga 2022/2:46 PM/sign      Subjective:  Marcin Sahu is here for a return obstetrical visit. Today she is 37w3d weeks EGA. She is doing well, taking her PNV as directed, and has no complaints. She  does not have vaginal bleeding, leaking, contractions, syncope, or headaches. Pt does feel fetal movement regularly. Objective: Mother's Prenatal Vitals  BP: 114/70  Weight: 182 lb (82.6 kg)  Pulse: 94  Patient Position: Sitting  Prenatal Fetal Information  Fundal Height (cm): 37 cm  Fetal Heart Rate: 152  Movement: Present  Cervical Exam  Dilation (cm): 2  Effacement: 70  Station: -2  Station (Labor Curve Graph): 7  Presentation: Vertex  Dil/Eff/Sta  Dilation (cm): 2  Effacement: 70  Station: -2  Pt is A&Ox3, in no acute distress. Normocephalic, atraumatic. PERRL. Resp even and non-labored. Skin pink, warm & dry. Gravid abdomen. DE LA CRUZ's well. Gait steady. Assessment:    IUP at 37w3d wks      Diagnosis Orders   1. Encounter for supervision of other normal pregnancy in third trimester       Plan:  Pt counseled on balanced nutrition, adequate fluid intake, taking PNV daily, and exercise along with labor precautions, GHTN precautions and Kick count  Continue with routine prenatal care.  surveillance not indicated  RTC in 1 wks for prenatal visit    MEDICATIONS:  No orders of the defined types were placed in this encounter. ORDERS:  No orders of the defined types were placed in this encounter. Yair Anglin MD, personally performed services described in this document as scribed by Neetu Caro RN in my presence, and it is both accurate and complete.

## 2022-05-17 NOTE — PATIENT INSTRUCTIONS
Patient Education        Week 40 of Your Pregnancy: Care Instructions  Overview     You are near the end of your pregnancy--and you're probably pretty uncomfortable. It may be harder to walk around. Lying down probably isn'tcomfortable either. You may have trouble getting to sleep or staying asleep. Most babies are born between 40 and 41 weeks. This is a good time to think about packing a bag for the hospital with items you'll need. Then you'll beready when labor starts. Follow-up care is a key part of your treatment and safety. Be sure to make and go to all appointments, and call your doctor if you are having problems. It's also a good idea to know your test results and keep alist of the medicines you take. How can you care for yourself at home? Learn about breastfeeding   Breastfeeding is best for your baby and good for you.  Breast milk has antibodies to help your baby fight infections.  If you breastfeed, you may lose weight faster. That's because making milk burns calories.  Learning the best ways to hold your baby will make breastfeeding easier.  Sometimes breastfeeding can make partners feel left out. If you have a partner, plan how you can care for your baby together. For example, your partner can bathe and diaper the baby. You can snuggle together when you breastfeed.  You may want to learn how to use a breast pump and store your milk.  If you choose to bottle feed, make the feeding feel like breastfeeding so you can bond with your baby. Always hold your baby and the bottle. Don't prop bottles or let your baby fall asleep with a bottle. Learn about crying   It's common for babies to cry for 1 to 3 hours a day. Some cry more, and some cry less.  Babies don't cry to make you upset or because you're a bad parent.  Crying is how your baby communicates. Your baby may be hungry; have gas; need a diaper change; or feel cold, warm, tired, lonely, or tense.  Sometimes babies cry for unknown reasons.  If you respond to your baby's needs, your baby will learn to trust you.  Try to stay calm when your baby cries. Your baby may get more upset if they sense that you are upset. Know how to care for your    Your baby's umbilical cord stump will drop off on its own, usually between 1 and 2 weeks. To care for your baby's umbilical cord area:  ? Clean the area at the bottom of the cord 2 or 3 times a day. ? Pay special attention to the area where the cord attaches to the skin. ? Keep the diaper folded below the cord. ? Use a damp washcloth or cotton ball to sponge bathe your baby until the stump has come off.  Your baby's first dark stool is called meconium. After the meconium is passed, your baby will develop their own bowel pattern. ? Some babies, especially  babies, have several bowel movements a day. Others have one or two a day, or one every 2 to 3 days. ?  babies often have loose, yellow stools. Formula-fed babies have more formed stools. ? If your baby's stools look like little pellets, your baby is constipated. After 2 days of constipation, call your baby's doctor.  If your baby will be circumcised, you can care for your baby at home. ? Gently rinse your baby's penis with warm water after every diaper change. Don't try to remove the film that forms on the penis. This film will go away on its own. Pat dry. ? Put petroleum ointment, such as Vaseline, on the area of the diaper that will touch your baby's penis. This will keep the diaper from sticking to your baby. ? Ask the doctor about giving your baby acetaminophen (Tylenol) for pain. Where can you learn more? Go to https://[place]milton.iConText. org and sign in to your [employer] account. Enter 17  48 in the [employer] box to learn more about \"Week 37 of Your Pregnancy: Care Instructions. \"     If you do not have an account, please click on the \"Sign Up Now\" link.   Current as of:  2021               Content Version: 13.2  © 4825-0361 Healthwise, Incorporated. Care instructions adapted under license by [facility] ([facility]). If you have questions about a medical condition or this instruction, always ask your healthcare professional. [address] any warranty or liability for your use of this information.

## 2022-05-24 ENCOUNTER — ROUTINE PRENATAL (OUTPATIENT)
Dept: OBGYN CLINIC | Age: 22
End: 2022-05-24
Payer: MEDICAID

## 2022-05-24 VITALS
WEIGHT: 180 LBS | DIASTOLIC BLOOD PRESSURE: 68 MMHG | HEART RATE: 100 BPM | SYSTOLIC BLOOD PRESSURE: 111 MMHG | BODY MASS INDEX: 29.95 KG/M2

## 2022-05-24 DIAGNOSIS — Z34.83 ENCOUNTER FOR SUPERVISION OF OTHER NORMAL PREGNANCY IN THIRD TRIMESTER: Primary | ICD-10-CM

## 2022-05-24 PROCEDURE — 99213 OFFICE O/P EST LOW 20 MIN: CPT | Performed by: OBSTETRICS & GYNECOLOGY

## 2022-05-24 NOTE — PATIENT INSTRUCTIONS
Patient Education        Week 45 of Your Pregnancy: Care Instructions  Overview     Believe it or not, your baby is almost here. You may have ideas about your baby's personality because of how much your baby moves. Or you may have noticed how your baby responds to sounds, warmth, cold, and light. You may even knowwhat kind of music your baby likes. By now, you have a better idea of what to expect during delivery. You may have talked about your birth preferences with your doctor. But even if you want a vaginal birth, it's a good idea to learn about  births.  birth means that your baby is born through a cut (incision) in your lower belly. Insome cases it may be the best choice for the health of you and your baby. Follow-up care is a key part of your treatment and safety. Be sure to make and go to all appointments, and call your doctor if you are having problems. It's also a good idea to know your test results and keep alist of the medicines you take. How can you care for yourself at home? Learn about  birth  Micaela Most C-sections are unplanned. They are done because of problems that occur during labor. These problems might include:  ? Labor that slows or stops. ? High blood pressure or other problems for you.  ? Signs of distress in your baby. These signs may include a very fast or slow heart rate.  Although you and your baby are likely do well after a , it is major surgery. It has more risks than a vaginal delivery.  In some cases, a planned  may be safer than a vaginal delivery. This may be the case if:  ? You have a health problem, such as a heart condition. ? Your baby isn't in a head-down position for delivery. This is called a breech position. ? The uterus has scars from past surgeries. This could increase the chance of a tear in the uterus. ? There is a problem with the placenta.  ?  You have an infection, such as genital herpes, that could be spread to your baby.  ? Renee Dempsey are having twins or more. ? Your baby weighs 9 to 10 pounds or more.  Because of the risks of a , planned C-sections generally should be done only for medical reasons. And a planned  should be done at 39 weeks or later unless there is a medical reason to do it sooner. Know what to expect after delivery, and plan for the first few weeks at home  [facility], your baby, and your partner or  will get identification bands. Only people with matching bands can  the baby from the nursery.  You will learn how to feed, diaper, and bathe your baby. And you will learn how to care for the umbilical cord stump. If your baby will be circumcised, you will also learn how to care for that.  Ask people to wait to visit you until you are at home. And ask them to wash their hands before they touch your baby.  Make sure you have another adult in your home for at least 2 or 3 days after the birth.  During the first 2 weeks, limit when friends and family can visit.  Do not allow visitors who have colds or infections. Make sure all visitors are up to date with their vaccinations. Never let anyone smoke around your baby.  Try to nap when the baby naps. Be aware of postpartum depression   \"Baby blues\" are common for the first 1 to 2 weeks after birth. You may cry or feel sad or irritable for no reason.  Sometimes these feelings last longer and are more intense. This is called postpartum depression.  If your symptoms last for more than a few weeks or you feel very depressed, ask your doctor for help.  Postpartum depression can be treated. Support groups and counseling can help. Sometimes medicine can also help. Where can you learn more? Go to https://Skillzanthony.[employer]. org and sign in to your [employer] account. Enter B044 in the [employer] box to learn more about \"Week 38 of Your Pregnancy: Care Instructions. \"     If you do not have an account, please click on the \"Sign Up Now\" link. Current as of: June 16, 2021               Content Version: 13.2  © 2006-2022 Healthwise, Incorporated. Care instructions adapted under license by [facility] [address] If you have questions about a medical condition or this instruction, always ask your healthcare professional. [address] any warranty or liability for your use of this information.

## 2022-05-24 NOTE — PROGRESS NOTES
I, Martha Reyes RN, am scribing for and in the presence of Dr. William Hawley 2022/10:52 AM/sign      Subjective:  Eusebio Prakash is here for a return obstetrical visit. Today she is 38w3d weeks EGA. She is having irregular contractions. She  does not have vaginal bleeding, leaking,  syncope, or headaches. Pt does feel fetal movement regularly. Objective: Mother's Prenatal Vitals  BP: 111/68  Weight: 180 lb (81.6 kg)  Pulse: 100  Patient Position: Sitting  Prenatal Fetal Information  Fetal Heart Rate: 146  Movement: Present  Cervical Exam  Dilation (cm): 3  Effacement: 60  Station: -3  Station (Labor Curve Graph): 8  Presentation: Vertex  Dil/Eff/Sta  Dilation (cm): 3  Effacement: 60  Station: -3  Pt is A&Ox3, in no acute distress. Normocephalic, atraumatic. PERRL. Resp even and non-labored. Skin pink, warm & dry. Gravid abdomen. DE LA CRUZ's well. Gait steady. Assessment:    IUP at 38w3d wks      Diagnosis Orders   1. Encounter for supervision of other normal pregnancy in third trimester       Plan:  Pt counseled on balanced nutrition, adequate fluid intake, taking PNV daily, and exercise along with labor precautions, GHTN precautions and Kick count  Continue with routine prenatal care.  surveillance not indicated  visit    MEDICATIONS:  No orders of the defined types were placed in this encounter. ORDERS:  No orders of the defined types were placed in this encounter. Sav Negrete MD, personally performed services described in this document as scribed by Jocelynn Pompa RN in my presence, and it is both accurate and complete.

## 2022-05-25 ENCOUNTER — HOSPITAL ENCOUNTER (INPATIENT)
Age: 22
LOS: 4 days | Discharge: HOME | End: 2022-05-29
Attending: OBSTETRICS & GYNECOLOGY | Admitting: OBSTETRICS & GYNECOLOGY
Payer: MEDICAID

## 2022-05-25 DIAGNOSIS — Z98.891 S/P PRIMARY LOW TRANSVERSE C-SECTION: ICD-10-CM

## 2022-05-25 DIAGNOSIS — Z3A.38 38 WEEKS GESTATION OF PREGNANCY: ICD-10-CM

## 2022-05-25 DIAGNOSIS — O42.90 RUPTURE OF MEMBRANES WITH DELAY OF DELIVERY: Primary | ICD-10-CM

## 2022-05-25 LAB
ABO/RH: NORMAL
AMPHETAMINE SCREEN, URINE: NEGATIVE
ANTIBODY SCREEN: NORMAL
BACTERIA: ABNORMAL /HPF
BARBITURATE SCREEN URINE: NEGATIVE
BENZODIAZEPINE SCREEN, URINE: NEGATIVE
BILIRUBIN URINE: NEGATIVE
BLOOD, URINE: ABNORMAL
CANNABINOID SCREEN URINE: NEGATIVE
CLARITY: ABNORMAL
COCAINE METABOLITE SCREEN URINE: NEGATIVE
COLOR: ABNORMAL
CRYSTALS, UA: ABNORMAL /HPF
EPITHELIAL CELLS, UA: 1 /HPF (ref 0–5)
GLUCOSE URINE: NEGATIVE MG/DL
HCT VFR BLD CALC: 38.4 % (ref 37–47)
HEMOGLOBIN: 12.5 G/DL (ref 12–16)
HYALINE CASTS: 37 /HPF (ref 0–8)
KETONES, URINE: ABNORMAL MG/DL
LEUKOCYTE ESTERASE, URINE: ABNORMAL
Lab: NORMAL
MCH RBC QN AUTO: 27.9 PG (ref 27–31)
MCHC RBC AUTO-ENTMCNC: 32.6 G/DL (ref 33–37)
MCV RBC AUTO: 85.7 FL (ref 81–99)
NITRITE, URINE: NEGATIVE
OPIATE SCREEN URINE: NEGATIVE
PDW BLD-RTO: 18.2 % (ref 11.5–14.5)
PH UA: 6.5 (ref 5–8)
PLATELET # BLD: 203 K/UL (ref 130–400)
PMV BLD AUTO: 10.2 FL (ref 9.4–12.3)
PROTEIN UA: ABNORMAL MG/DL
RBC # BLD: 4.48 M/UL (ref 4.2–5.4)
RBC UA: 3 /HPF (ref 0–4)
SARS-COV-2, NAAT: NOT DETECTED
SPECIFIC GRAVITY UA: 1.02 (ref 1–1.03)
UROBILINOGEN, URINE: 1 E.U./DL
WBC # BLD: 9.9 K/UL (ref 4.8–10.8)
WBC UA: 66 /HPF (ref 0–5)

## 2022-05-25 PROCEDURE — 85027 COMPLETE CBC AUTOMATED: CPT

## 2022-05-25 PROCEDURE — 87635 SARS-COV-2 COVID-19 AMP PRB: CPT

## 2022-05-25 PROCEDURE — 2580000003 HC RX 258: Performed by: OBSTETRICS & GYNECOLOGY

## 2022-05-25 PROCEDURE — 1220000000 HC SEMI PRIVATE OB R&B

## 2022-05-25 PROCEDURE — 81001 URINALYSIS AUTO W/SCOPE: CPT

## 2022-05-25 PROCEDURE — 86592 SYPHILIS TEST NON-TREP QUAL: CPT

## 2022-05-25 PROCEDURE — 36415 COLL VENOUS BLD VENIPUNCTURE: CPT

## 2022-05-25 PROCEDURE — 86900 BLOOD TYPING SEROLOGIC ABO: CPT

## 2022-05-25 PROCEDURE — 86901 BLOOD TYPING SEROLOGIC RH(D): CPT

## 2022-05-25 PROCEDURE — 86850 RBC ANTIBODY SCREEN: CPT

## 2022-05-25 PROCEDURE — 80307 DRUG TEST PRSMV CHEM ANLYZR: CPT

## 2022-05-25 RX ORDER — SODIUM CHLORIDE, SODIUM LACTATE, POTASSIUM CHLORIDE, AND CALCIUM CHLORIDE .6; .31; .03; .02 G/100ML; G/100ML; G/100ML; G/100ML
500 INJECTION, SOLUTION INTRAVENOUS PRN
Status: DISCONTINUED | OUTPATIENT
Start: 2022-05-25 | End: 2022-05-29 | Stop reason: HOSPADM

## 2022-05-25 RX ORDER — SODIUM CHLORIDE, SODIUM LACTATE, POTASSIUM CHLORIDE, AND CALCIUM CHLORIDE .6; .31; .03; .02 G/100ML; G/100ML; G/100ML; G/100ML
1000 INJECTION, SOLUTION INTRAVENOUS PRN
Status: DISCONTINUED | OUTPATIENT
Start: 2022-05-25 | End: 2022-05-29 | Stop reason: HOSPADM

## 2022-05-25 RX ORDER — SODIUM CHLORIDE 0.9 % (FLUSH) 0.9 %
5-40 SYRINGE (ML) INJECTION EVERY 12 HOURS SCHEDULED
Status: DISCONTINUED | OUTPATIENT
Start: 2022-05-25 | End: 2022-05-26 | Stop reason: SDUPTHER

## 2022-05-25 RX ORDER — SODIUM CHLORIDE 0.9 % (FLUSH) 0.9 %
5-40 SYRINGE (ML) INJECTION PRN
Status: DISCONTINUED | OUTPATIENT
Start: 2022-05-25 | End: 2022-05-26 | Stop reason: SDUPTHER

## 2022-05-25 RX ORDER — ONDANSETRON 2 MG/ML
4 INJECTION INTRAMUSCULAR; INTRAVENOUS EVERY 6 HOURS PRN
Status: DISCONTINUED | OUTPATIENT
Start: 2022-05-25 | End: 2022-05-25

## 2022-05-25 RX ORDER — SODIUM CHLORIDE, SODIUM LACTATE, POTASSIUM CHLORIDE, CALCIUM CHLORIDE 600; 310; 30; 20 MG/100ML; MG/100ML; MG/100ML; MG/100ML
INJECTION, SOLUTION INTRAVENOUS CONTINUOUS
Status: DISCONTINUED | OUTPATIENT
Start: 2022-05-25 | End: 2022-05-26 | Stop reason: SDUPTHER

## 2022-05-25 RX ORDER — DOCUSATE SODIUM 100 MG/1
100 CAPSULE, LIQUID FILLED ORAL 2 TIMES DAILY
Status: DISCONTINUED | OUTPATIENT
Start: 2022-05-25 | End: 2022-05-26 | Stop reason: SDUPTHER

## 2022-05-25 RX ORDER — ONDANSETRON 2 MG/ML
4 INJECTION INTRAMUSCULAR; INTRAVENOUS EVERY 6 HOURS PRN
Status: DISCONTINUED | OUTPATIENT
Start: 2022-05-25 | End: 2022-05-29 | Stop reason: HOSPADM

## 2022-05-25 RX ORDER — SODIUM CHLORIDE 9 MG/ML
25 INJECTION, SOLUTION INTRAVENOUS PRN
Status: DISCONTINUED | OUTPATIENT
Start: 2022-05-25 | End: 2022-05-26 | Stop reason: SDUPTHER

## 2022-05-25 RX ORDER — ACETAMINOPHEN 325 MG/1
650 TABLET ORAL EVERY 4 HOURS PRN
Status: DISCONTINUED | OUTPATIENT
Start: 2022-05-25 | End: 2022-05-29 | Stop reason: HOSPADM

## 2022-05-25 RX ORDER — ONDANSETRON 4 MG/1
4 TABLET, ORALLY DISINTEGRATING ORAL EVERY 8 HOURS PRN
Status: DISCONTINUED | OUTPATIENT
Start: 2022-05-25 | End: 2022-05-29 | Stop reason: HOSPADM

## 2022-05-25 RX ADMIN — SODIUM CHLORIDE, PRESERVATIVE FREE 10 ML: 5 INJECTION INTRAVENOUS at 22:37

## 2022-05-25 ASSESSMENT — PAIN SCALES - GENERAL: PAINLEVEL_OUTOF10: 5

## 2022-05-26 ENCOUNTER — APPOINTMENT (OUTPATIENT)
Dept: GENERAL RADIOLOGY | Age: 22
End: 2022-05-26
Payer: MEDICAID

## 2022-05-26 ENCOUNTER — ANESTHESIA (OUTPATIENT)
Dept: LABOR AND DELIVERY | Age: 22
End: 2022-05-26
Payer: MEDICAID

## 2022-05-26 ENCOUNTER — ANESTHESIA EVENT (OUTPATIENT)
Dept: LABOR AND DELIVERY | Age: 22
End: 2022-05-26
Payer: MEDICAID

## 2022-05-26 LAB — RPR: NORMAL

## 2022-05-26 PROCEDURE — 6360000002 HC RX W HCPCS

## 2022-05-26 PROCEDURE — 2580000003 HC RX 258

## 2022-05-26 PROCEDURE — 2580000003 HC RX 258: Performed by: OBSTETRICS & GYNECOLOGY

## 2022-05-26 PROCEDURE — 3700000000 HC ANESTHESIA ATTENDED CARE

## 2022-05-26 PROCEDURE — 3609079900 HC CESAREAN SECTION: Performed by: OBSTETRICS & GYNECOLOGY

## 2022-05-26 PROCEDURE — 7100000001 HC PACU RECOVERY - ADDTL 15 MIN: Performed by: OBSTETRICS & GYNECOLOGY

## 2022-05-26 PROCEDURE — 7100000000 HC PACU RECOVERY - FIRST 15 MIN: Performed by: OBSTETRICS & GYNECOLOGY

## 2022-05-26 PROCEDURE — 74018 RADEX ABDOMEN 1 VIEW: CPT

## 2022-05-26 PROCEDURE — 3700000001 HC ADD 15 MINUTES (ANESTHESIA)

## 2022-05-26 PROCEDURE — 2709999900 HC NON-CHARGEABLE SUPPLY: Performed by: OBSTETRICS & GYNECOLOGY

## 2022-05-26 PROCEDURE — 1220000000 HC SEMI PRIVATE OB R&B

## 2022-05-26 PROCEDURE — 6370000000 HC RX 637 (ALT 250 FOR IP): Performed by: OBSTETRICS & GYNECOLOGY

## 2022-05-26 PROCEDURE — 6360000002 HC RX W HCPCS: Performed by: OBSTETRICS & GYNECOLOGY

## 2022-05-26 RX ORDER — KETOROLAC TROMETHAMINE 30 MG/ML
30 INJECTION, SOLUTION INTRAMUSCULAR; INTRAVENOUS EVERY 6 HOURS
Status: DISPENSED | OUTPATIENT
Start: 2022-05-26 | End: 2022-05-27

## 2022-05-26 RX ORDER — IBUPROFEN 400 MG/1
800 TABLET ORAL EVERY 8 HOURS
Status: DISCONTINUED | OUTPATIENT
Start: 2022-05-27 | End: 2022-05-28

## 2022-05-26 RX ORDER — SODIUM CHLORIDE 0.9 % (FLUSH) 0.9 %
5-40 SYRINGE (ML) INJECTION EVERY 12 HOURS SCHEDULED
Status: DISCONTINUED | OUTPATIENT
Start: 2022-05-26 | End: 2022-05-29 | Stop reason: HOSPADM

## 2022-05-26 RX ORDER — SODIUM CHLORIDE 0.9 % (FLUSH) 0.9 %
5-40 SYRINGE (ML) INJECTION PRN
Status: DISCONTINUED | OUTPATIENT
Start: 2022-05-26 | End: 2022-05-29 | Stop reason: HOSPADM

## 2022-05-26 RX ORDER — DOCUSATE SODIUM 100 MG/1
100 CAPSULE, LIQUID FILLED ORAL 2 TIMES DAILY
Status: DISCONTINUED | OUTPATIENT
Start: 2022-05-26 | End: 2022-05-29 | Stop reason: HOSPADM

## 2022-05-26 RX ORDER — SODIUM CHLORIDE, SODIUM LACTATE, POTASSIUM CHLORIDE, CALCIUM CHLORIDE 600; 310; 30; 20 MG/100ML; MG/100ML; MG/100ML; MG/100ML
INJECTION, SOLUTION INTRAVENOUS CONTINUOUS
Status: DISCONTINUED | OUTPATIENT
Start: 2022-05-26 | End: 2022-05-29 | Stop reason: HOSPADM

## 2022-05-26 RX ORDER — SODIUM CHLORIDE, SODIUM LACTATE, POTASSIUM CHLORIDE, CALCIUM CHLORIDE 600; 310; 30; 20 MG/100ML; MG/100ML; MG/100ML; MG/100ML
INJECTION, SOLUTION INTRAVENOUS CONTINUOUS PRN
Status: DISCONTINUED | OUTPATIENT
Start: 2022-05-26 | End: 2022-05-26 | Stop reason: SDUPTHER

## 2022-05-26 RX ORDER — SODIUM CHLORIDE 9 MG/ML
INJECTION, SOLUTION INTRAVENOUS PRN
Status: DISCONTINUED | OUTPATIENT
Start: 2022-05-26 | End: 2022-05-29 | Stop reason: HOSPADM

## 2022-05-26 RX ORDER — OXYCODONE HYDROCHLORIDE 5 MG/1
5 TABLET ORAL EVERY 4 HOURS PRN
Status: DISCONTINUED | OUTPATIENT
Start: 2022-05-26 | End: 2022-05-29 | Stop reason: HOSPADM

## 2022-05-26 RX ORDER — CARBOPROST TROMETHAMINE 250 UG/ML
250 INJECTION, SOLUTION INTRAMUSCULAR PRN
Status: DISCONTINUED | OUTPATIENT
Start: 2022-05-26 | End: 2022-05-29 | Stop reason: HOSPADM

## 2022-05-26 RX ORDER — PROPOFOL 10 MG/ML
INJECTION, EMULSION INTRAVENOUS PRN
Status: DISCONTINUED | OUTPATIENT
Start: 2022-05-26 | End: 2022-05-26 | Stop reason: SDUPTHER

## 2022-05-26 RX ORDER — OXYCODONE HYDROCHLORIDE 5 MG/1
10 TABLET ORAL EVERY 4 HOURS PRN
Status: DISCONTINUED | OUTPATIENT
Start: 2022-05-26 | End: 2022-05-29 | Stop reason: HOSPADM

## 2022-05-26 RX ORDER — MISOPROSTOL 200 UG/1
800 TABLET ORAL PRN
Status: COMPLETED | OUTPATIENT
Start: 2022-05-26 | End: 2022-05-26

## 2022-05-26 RX ORDER — BISACODYL 10 MG
10 SUPPOSITORY, RECTAL RECTAL DAILY PRN
Status: DISCONTINUED | OUTPATIENT
Start: 2022-05-26 | End: 2022-05-29 | Stop reason: HOSPADM

## 2022-05-26 RX ORDER — PRENATAL VIT/IRON FUM/FOLIC AC 27MG-0.8MG
1 TABLET ORAL DAILY
Status: DISCONTINUED | OUTPATIENT
Start: 2022-05-26 | End: 2022-05-29 | Stop reason: HOSPADM

## 2022-05-26 RX ORDER — METHYLERGONOVINE MALEATE 0.2 MG/ML
200 INJECTION INTRAVENOUS PRN
Status: DISCONTINUED | OUTPATIENT
Start: 2022-05-26 | End: 2022-05-29 | Stop reason: HOSPADM

## 2022-05-26 RX ORDER — ONDANSETRON 2 MG/ML
INJECTION INTRAMUSCULAR; INTRAVENOUS PRN
Status: DISCONTINUED | OUTPATIENT
Start: 2022-05-26 | End: 2022-05-26 | Stop reason: SDUPTHER

## 2022-05-26 RX ORDER — DEXAMETHASONE SODIUM PHOSPHATE 10 MG/ML
INJECTION, SOLUTION INTRAMUSCULAR; INTRAVENOUS PRN
Status: DISCONTINUED | OUTPATIENT
Start: 2022-05-26 | End: 2022-05-26 | Stop reason: SDUPTHER

## 2022-05-26 RX ORDER — FENTANYL CITRATE 50 UG/ML
INJECTION, SOLUTION INTRAMUSCULAR; INTRAVENOUS PRN
Status: DISCONTINUED | OUTPATIENT
Start: 2022-05-26 | End: 2022-05-26 | Stop reason: SDUPTHER

## 2022-05-26 RX ORDER — SUCCINYLCHOLINE CHLORIDE 20 MG/ML
INJECTION INTRAMUSCULAR; INTRAVENOUS PRN
Status: DISCONTINUED | OUTPATIENT
Start: 2022-05-26 | End: 2022-05-26 | Stop reason: SDUPTHER

## 2022-05-26 RX ORDER — ENOXAPARIN SODIUM 100 MG/ML
40 INJECTION SUBCUTANEOUS DAILY
Status: DISCONTINUED | OUTPATIENT
Start: 2022-05-26 | End: 2022-05-29 | Stop reason: HOSPADM

## 2022-05-26 RX ADMIN — PROPOFOL 150 MG: 10 INJECTION, EMULSION INTRAVENOUS at 10:40

## 2022-05-26 RX ADMIN — SODIUM CHLORIDE, SODIUM LACTATE, POTASSIUM CHLORIDE, AND CALCIUM CHLORIDE: 600; 310; 30; 20 INJECTION, SOLUTION INTRAVENOUS at 11:18

## 2022-05-26 RX ADMIN — METHYLERGONOVINE MALEATE 200 MCG: 0.2 INJECTION, SOLUTION INTRAMUSCULAR; INTRAVENOUS at 13:58

## 2022-05-26 RX ADMIN — DEXAMETHASONE SODIUM PHOSPHATE 10 MG: 10 INJECTION, SOLUTION INTRAMUSCULAR; INTRAVENOUS at 10:49

## 2022-05-26 RX ADMIN — HYDROMORPHONE HYDROCHLORIDE 0.2 MG: 1 INJECTION, SOLUTION INTRAMUSCULAR; INTRAVENOUS; SUBCUTANEOUS at 10:56

## 2022-05-26 RX ADMIN — OXYCODONE 10 MG: 5 TABLET ORAL at 16:26

## 2022-05-26 RX ADMIN — OXYCODONE 10 MG: 5 TABLET ORAL at 21:33

## 2022-05-26 RX ADMIN — SODIUM CHLORIDE, SODIUM LACTATE, POTASSIUM CHLORIDE, AND CALCIUM CHLORIDE: 600; 310; 30; 20 INJECTION, SOLUTION INTRAVENOUS at 10:37

## 2022-05-26 RX ADMIN — OXYCODONE 10 MG: 5 TABLET ORAL at 12:47

## 2022-05-26 RX ADMIN — SUCCINYLCHOLINE CHLORIDE 200 MG: 20 INJECTION, SOLUTION INTRAMUSCULAR; INTRAVENOUS at 10:40

## 2022-05-26 RX ADMIN — HYDROMORPHONE HYDROCHLORIDE 0.4 MG: 1 INJECTION, SOLUTION INTRAMUSCULAR; INTRAVENOUS; SUBCUTANEOUS at 10:59

## 2022-05-26 RX ADMIN — Medication 999 ML/HR: at 10:46

## 2022-05-26 RX ADMIN — Medication 87.3 MILLI-UNITS/MIN: at 11:43

## 2022-05-26 RX ADMIN — DOCUSATE SODIUM 100 MG: 100 CAPSULE, LIQUID FILLED ORAL at 21:33

## 2022-05-26 RX ADMIN — FENTANYL CITRATE 100 MCG: 50 INJECTION, SOLUTION INTRAMUSCULAR; INTRAVENOUS at 11:28

## 2022-05-26 RX ADMIN — MISOPROSTOL 800 MCG: 200 TABLET ORAL at 12:48

## 2022-05-26 RX ADMIN — Medication 500 ML/HR: at 11:19

## 2022-05-26 RX ADMIN — HYDROMORPHONE HYDROCHLORIDE 0.2 MG: 1 INJECTION, SOLUTION INTRAMUSCULAR; INTRAVENOUS; SUBCUTANEOUS at 10:53

## 2022-05-26 RX ADMIN — SODIUM CHLORIDE, POTASSIUM CHLORIDE, SODIUM LACTATE AND CALCIUM CHLORIDE: 600; 310; 30; 20 INJECTION, SOLUTION INTRAVENOUS at 04:18

## 2022-05-26 RX ADMIN — HYDROMORPHONE HYDROCHLORIDE 0.2 MG: 1 INJECTION, SOLUTION INTRAMUSCULAR; INTRAVENOUS; SUBCUTANEOUS at 10:49

## 2022-05-26 RX ADMIN — FENTANYL CITRATE 50 MCG: 50 INJECTION, SOLUTION INTRAMUSCULAR; INTRAVENOUS at 11:34

## 2022-05-26 RX ADMIN — Medication 1 MILLI-UNITS/MIN: at 04:29

## 2022-05-26 RX ADMIN — ONDANSETRON 4 MG: 2 INJECTION INTRAMUSCULAR; INTRAVENOUS at 10:49

## 2022-05-26 ASSESSMENT — LIFESTYLE VARIABLES: SMOKING_STATUS: 0

## 2022-05-26 ASSESSMENT — PAIN DESCRIPTION - PAIN TYPE: TYPE: SURGICAL PAIN

## 2022-05-26 ASSESSMENT — PAIN DESCRIPTION - ORIENTATION: ORIENTATION: LOWER

## 2022-05-26 ASSESSMENT — PAIN DESCRIPTION - LOCATION
LOCATION: ABDOMEN
LOCATION: ABDOMEN
LOCATION: ABDOMEN;INCISION

## 2022-05-26 ASSESSMENT — PAIN DESCRIPTION - DESCRIPTORS
DESCRIPTORS: SHARP
DESCRIPTORS: ACHING;BURNING

## 2022-05-26 ASSESSMENT — PAIN SCALES - GENERAL
PAINLEVEL_OUTOF10: 8
PAINLEVEL_OUTOF10: 7
PAINLEVEL_OUTOF10: 7

## 2022-05-26 ASSESSMENT — PAIN - FUNCTIONAL ASSESSMENT: PAIN_FUNCTIONAL_ASSESSMENT: PREVENTS OR INTERFERES SOME ACTIVE ACTIVITIES AND ADLS

## 2022-05-26 NOTE — FLOWSHEET NOTE
Dr. Almodovar Going notified via phone of shift handoff. Verified that she plans to break water in the am and prefers to manage this patient through the night.

## 2022-05-26 NOTE — FLOWSHEET NOTE
Dr Galan Mask to room,sve and prolapse cord noted. Pitocin d/c and stat  section called. OB staff to room. Dr Galan Mask and this nurse switched and pressure held off cord until delivery.

## 2022-05-26 NOTE — FLOWSHEET NOTE
Dr Nani Acuña notified pt bleeding has increased to moderate with large clots. cytotec 800mcg rectal ordered.

## 2022-05-26 NOTE — LACTATION NOTE
Infant Name: Baby Boy  Gestation: 38.4  Day of Life: NB  Birth weight: 8-5.7 lb (3790g)  Today's weight:  Weight loss:  24 hour summary of feeds:  Voids:  Stools:  Assistive device: none  Maternal History: ,   Maternal Medications: zofran, ferrous sulfate, PNV  Maternal Goal: one day at a time  Breast pump for home: faxed script to [facility] is currently in our level 2 nsy. Encouraged mother to start pumping. Hospital grade electric pump provided. Instructions for set up and cleaning given. Hand expression, breast compression encouraged to increase milk transfer while pumping. Instructed to pump for 15 mins every 2-3 hours and to give EBM to nursery nurse so they can date and time EBM and place in freezer. Observed pumping sessions flanged fit appropriate 27 mm. Information discussing the benefits of colostrum given. Mother pumped for 15 mins obtained 3 ml, labeled with date and time, placed in freezer until baby has orders to eat. Supply and demand discussed. Mother understands and agrees with feeding plan. Encouragement and support provided.

## 2022-05-26 NOTE — ANESTHESIA PRE PROCEDURE
Department of Anesthesiology  Preprocedure Note       Name:  Mohit Syed   Age:  22 y.o.  :  2000                                          MRN:  [id number]         Date:  2022      Surgeon: * No surgeons listed *    Procedure: * No procedures listed *    Medications prior to admission:   Prior to Admission medications    Medication Sig Start Date End Date Taking?  Authorizing Provider   ondansetron (ZOFRAN ODT) 4 MG disintegrating tablet Take 1 tablet by mouth every 8 hours as needed for Nausea or Vomiting 22   Tiny Major, APRN - CNP   ferrous sulfate (IRON 325) 325 (65 Fe) MG tablet Take 1 tablet by mouth 2 times daily 3/22/22   Tiny Major, APRN - CNP   Prenatal Multivit-Min-Fe-FA (PRENATAL 1 + IRON PO) Take by mouth    Historical Provider, MD       Current medications:    Current Facility-Administered Medications   Medication Dose Route Frequency Provider Last Rate Last Admin    oxytocin (PITOCIN) 30 units in 500 mL infusion  1-20 paola-units/min IntraVENous Continuous Porsha Carlson MD 10 mL/hr at 22 0656 10 paola-units/min at 22 0656    lactated ringers infusion   IntraVENous Continuous Porsha Carlson MD        sodium chloride flush 0.9 % injection 5-40 mL  5-40 mL IntraVENous 2 times per day Porsha Carlson MD        sodium chloride flush 0.9 % injection 5-40 mL  5-40 mL IntraVENous PRN Porsha Carlson MD        0.9 % sodium chloride infusion   IntraVENous PRN Porsha Carlson MD        carboprost (HEMABATE) injection 250 mcg  250 mcg IntraMUSCular PRN Porsha Carlson MD        methylergonovine (METHERGINE) injection 200 mcg  200 mcg IntraMUSCular PRN Porsha Carlson MD        miSOPROStol (CYTOTEC) tablet 800 mcg  800 mcg Rectal PRN Porsha Carlson MD        docusate sodium (COLACE) capsule 100 mg  100 mg Oral BID Porsha Carlson MD        bisacodyl (DULCOLAX) suppository 10 mg  10 mg Rectal Daily PRN Porsha Carlson MD        Lanolin cream   Topical Q1H PRN Julian Watts MD        prenatal vitamin tablet 1 tablet  1 tablet Oral Daily Julian Watts MD        Tetanus-Diphth-Acell Pertussis (BOOSTRIX) injection 0.5 mL  0.5 mL IntraMUSCular Prior to discharge Julian Watts MD        enoxaparin (LOVENOX) injection 40 mg  40 mg SubCUTAneous Daily Julian Watts MD        ketorolac (TORADOL) injection 30 mg  30 mg IntraMUSCular Christina Ray MD        [START ON 5/27/2022] ibuprofen (ADVIL;MOTRIN) tablet 800 mg  800 mg Oral Q8H uJlian Watts MD        oxyCODONE (ROXICODONE) immediate release tablet 5 mg  5 mg Oral Q4H PRN Julian Watts MD        Or    oxyCODONE (ROXICODONE) immediate release tablet 10 mg  10 mg Oral Q4H PRN Julian Watts MD        ondansetron (ZOFRAN-ODT) disintegrating tablet 4 mg  4 mg Oral Q8H PRN Julian Watts MD        acetaminophen (TYLENOL) tablet 650 mg  650 mg Oral Q4H PRN Julian Watts MD        lactated ringers bolus  500 mL IntraVENous PRN Julian Watts MD        Or    lactated ringers bolus  1,000 mL IntraVENous PRN Julian Watts MD        oxytocin (PITOCIN) 30 units in 500 mL infusion  87.3 paola-units/min IntraVENous Continuous PRN Julian Watts MD        And    oxytocin (PITOCIN) 10 unit bolus from the bag  10 Units IntraVENous PRANJALI Watts MD        ondansetron [facility]) injection 4 mg  4 mg IntraVENous Q6H PRN Julian Watts MD        butorphanol (STADOL) injection 1 mg  1 mg IntraVENous Q3H PRN Julian Watts MD         Facility-Administered Medications Ordered in Other Encounters   Medication Dose Route Frequency Provider Last Rate Last Admin    propofol injection   IntraVENous PRN RERE Morgan CRNA   150 mg at 05/26/22 1040    succinylcholine (ANECTINE) injection   IntraVENous PRN RERE Morgan CRNA   200 mg at 05/26/22 1040    dexamethasone (PF) (DECADRON) injection   IntraVENous PRN RERE Morgan - CRNA   10 mg at 05/26/22 1049    ondansetron [facility]) injection   IntraVENous PRN Charu Dana, APRN - CRNA   4 mg at 05/26/22 1049    HYDROmorphone (DILAUDID) injection   IntraVENous PRN Charu Dana, APRN - CRNA   0.4 mg at 05/26/22 1059    lactated ringers infusion   IntraVENous Continuous PRN Charu Dana, APRN - CRNA   New Bag at 05/26/22 1118    oxytocin (PITOCIN) 30 units in 500 mL infusion   IntraVENous PRN Charu Dana, APRN - CRNA 500 mL/hr at 05/26/22 1119 500 mL/hr at 05/26/22 1119    fentaNYL (SUBLIMAZE) injection   IntraVENous PRN Charu Dana, APRN - CRNA   50 mcg at 05/26/22 1134       Allergies: Allergies   Allergen Reactions    No Known Allergies        Problem List:    Patient Active Problem List   Diagnosis Code    39 weeks gestation of pregnancy Z3A.39    33 weeks gestation of pregnancy Z3A.33    Rupture of membranes with delay of delivery O42.90    Low back pain M54.50    NST (non-stress test) nonreactive O28.8    Term pregnancy O95.04    Anemia complicating pregnancy in third trimester O99.013    29 weeks gestation of pregnancy Z3A.29    36 weeks gestation of pregnancy Z3A.36    38 weeks gestation of pregnancy Z3A.38       Past Medical History:  History reviewed. No pertinent past medical history. Past Surgical History:  History reviewed. No pertinent surgical history.     Social History:    Social History     Tobacco Use    Smoking status: Never Smoker    Smokeless tobacco: Never Used   Substance Use Topics    Alcohol use: Never                                Counseling given: Not Answered      Vital Signs (Current):   Vitals:    05/26/22 0400 05/26/22 0656 05/26/22 0909 05/26/22 1137   BP: (!) 90/57 111/75 119/65 114/62   Pulse: 87 84 95 89   Resp: 16 16 18    Temp: 97 °F (36.1 °C) 98.2 °F (36.8 °C) 97.9 °F (36.6 °C) 97 °F (36.1 °C)   TempSrc:  Temporal Temporal Temporal   SpO2: 100%      Weight:       Height:                                                  BP Readings from Last 3 Encounters:   05/26/22 114/62 05/24/22 111/68   05/17/22 114/70       NPO Status:                                                                                 BMI:   Wt Readings from Last 3 Encounters:   05/25/22 180 lb (81.6 kg)   05/24/22 180 lb (81.6 kg)   05/17/22 182 lb (82.6 kg)     Body mass index is 29.95 kg/m². CBC:   Lab Results   Component Value Date    WBC 9.9 05/25/2022    RBC 4.48 05/25/2022    HGB 12.5 05/25/2022    HCT 38.4 05/25/2022    MCV 85.7 05/25/2022    RDW 18.2 05/25/2022     05/25/2022       CMP:   Lab Results   Component Value Date     05/02/2022    K 3.8 05/02/2022     05/02/2022    CO2 24 05/02/2022    BUN 4 05/02/2022    CREATININE 0.4 05/02/2022    GFRAA >59 05/02/2022    LABGLOM >60 05/02/2022    GLUCOSE 106 05/02/2022    PROT 5.8 05/02/2022    CALCIUM 8.2 05/02/2022    BILITOT <0.2 05/02/2022    ALKPHOS 87 05/02/2022    AST 12 05/02/2022    ALT 8 05/02/2022       POC Tests: No results for input(s): POCGLU, POCNA, POCK, POCCL, POCBUN, POCHEMO, POCHCT in the last 72 hours.     Coags: No results found for: PROTIME, INR, APTT    HCG (If Applicable): No results found for: PREGTESTUR, PREGSERUM, HCG, HCGQUANT     ABGs: No results found for: PHART, PO2ART, SXT6SRG, GGA7XWJ, BEART, C3XQCXFI     Type & Screen (If Applicable):  No results found for: LABABO, LABRH    Drug/Infectious Status (If Applicable):  No results found for: HIV, HEPCAB    COVID-19 Screening (If Applicable):   Lab Results   Component Value Date    COVID19 Not Detected 05/25/2022           Anesthesia Evaluation    Airway: Mallampati: II  TM distance: >3 FB   Neck ROM: full  Mouth opening: > = 3 FB   Dental: normal exam         Pulmonary:Negative Pulmonary ROS and normal exam        (-) not a current smoker                           Cardiovascular:Negative CV ROS  Exercise tolerance: good (>4 METS),            Beta Blocker:  Not on Beta Blocker         Neuro/Psych:   Negative Neuro/Psych ROS              GI/Hepatic/Renal: Neg GI/Hepatic/Renal ROS       (-) GERD       Endo/Other: Negative Endo/Other ROS                    Abdominal:             Vascular: negative vascular ROS. Other Findings:           Anesthesia Plan      general     ASA 3 - emergent       Induction: intravenous. MIPS: Postoperative opioids intended and Prophylactic antiemetics administered. Anesthetic plan and risks discussed with patient. Use of blood products discussed with patient whom consented to blood products.                      RERE Bergeron - CRNA   5/26/2022

## 2022-05-27 LAB
BASOPHILS ABSOLUTE: 0 K/UL (ref 0–0.2)
BASOPHILS RELATIVE PERCENT: 0.3 % (ref 0–1)
EOSINOPHILS ABSOLUTE: 0 K/UL (ref 0–0.6)
EOSINOPHILS RELATIVE PERCENT: 0.3 % (ref 0–5)
HCT VFR BLD CALC: 31.2 % (ref 37–47)
HEMOGLOBIN: 10.1 G/DL (ref 12–16)
IMMATURE GRANULOCYTES #: 0.1 K/UL
LYMPHOCYTES ABSOLUTE: 2 K/UL (ref 1.1–4.5)
LYMPHOCYTES RELATIVE PERCENT: 17.8 % (ref 20–40)
MCH RBC QN AUTO: 28.2 PG (ref 27–31)
MCHC RBC AUTO-ENTMCNC: 32.4 G/DL (ref 33–37)
MCV RBC AUTO: 87.2 FL (ref 81–99)
MONOCYTES ABSOLUTE: 1 K/UL (ref 0–0.9)
MONOCYTES RELATIVE PERCENT: 8.4 % (ref 0–10)
NEUTROPHILS ABSOLUTE: 8.3 K/UL (ref 1.5–7.5)
NEUTROPHILS RELATIVE PERCENT: 72.7 % (ref 50–65)
PDW BLD-RTO: 17.6 % (ref 11.5–14.5)
PLATELET # BLD: 196 K/UL (ref 130–400)
PMV BLD AUTO: 11.6 FL (ref 9.4–12.3)
RBC # BLD: 3.58 M/UL (ref 4.2–5.4)
WBC # BLD: 11.3 K/UL (ref 4.8–10.8)

## 2022-05-27 PROCEDURE — 6370000000 HC RX 637 (ALT 250 FOR IP): Performed by: OBSTETRICS & GYNECOLOGY

## 2022-05-27 PROCEDURE — 2580000003 HC RX 258: Performed by: OBSTETRICS & GYNECOLOGY

## 2022-05-27 PROCEDURE — 99232 SBSQ HOSP IP/OBS MODERATE 35: CPT | Performed by: NURSE PRACTITIONER

## 2022-05-27 PROCEDURE — 36415 COLL VENOUS BLD VENIPUNCTURE: CPT

## 2022-05-27 PROCEDURE — 85025 COMPLETE CBC W/AUTO DIFF WBC: CPT

## 2022-05-27 PROCEDURE — 1220000000 HC SEMI PRIVATE OB R&B

## 2022-05-27 PROCEDURE — 59514 CESAREAN DELIVERY ONLY: CPT | Performed by: OBSTETRICS & GYNECOLOGY

## 2022-05-27 RX ADMIN — IBUPROFEN 800 MG: 400 TABLET ORAL at 08:00

## 2022-05-27 RX ADMIN — OXYCODONE 5 MG: 5 TABLET ORAL at 20:09

## 2022-05-27 RX ADMIN — PRENATAL VIT W/ FE FUMARATE-FA TAB 27-0.8 MG 1 TABLET: 27-0.8 TAB at 08:22

## 2022-05-27 RX ADMIN — IBUPROFEN 800 MG: 400 TABLET ORAL at 18:23

## 2022-05-27 RX ADMIN — OXYCODONE 10 MG: 5 TABLET ORAL at 06:29

## 2022-05-27 RX ADMIN — DOCUSATE SODIUM 100 MG: 100 CAPSULE, LIQUID FILLED ORAL at 20:08

## 2022-05-27 RX ADMIN — OXYCODONE 10 MG: 5 TABLET ORAL at 10:27

## 2022-05-27 RX ADMIN — DOCUSATE SODIUM 100 MG: 100 CAPSULE, LIQUID FILLED ORAL at 08:21

## 2022-05-27 RX ADMIN — OXYCODONE 10 MG: 5 TABLET ORAL at 14:31

## 2022-05-27 RX ADMIN — OXYCODONE 10 MG: 5 TABLET ORAL at 01:58

## 2022-05-27 RX ADMIN — SODIUM CHLORIDE, PRESERVATIVE FREE 10 ML: 5 INJECTION INTRAVENOUS at 01:58

## 2022-05-27 ASSESSMENT — PAIN SCALES - GENERAL
PAINLEVEL_OUTOF10: 4
PAINLEVEL_OUTOF10: 9
PAINLEVEL_OUTOF10: 7
PAINLEVEL_OUTOF10: 7
PAINLEVEL_OUTOF10: 6

## 2022-05-27 ASSESSMENT — PAIN - FUNCTIONAL ASSESSMENT: PAIN_FUNCTIONAL_ASSESSMENT: PREVENTS OR INTERFERES SOME ACTIVE ACTIVITIES AND ADLS

## 2022-05-27 ASSESSMENT — PAIN DESCRIPTION - LOCATION
LOCATION: ABDOMEN
LOCATION: ABDOMEN

## 2022-05-27 ASSESSMENT — PAIN DESCRIPTION - ORIENTATION: ORIENTATION: LOWER

## 2022-05-27 ASSESSMENT — PAIN DESCRIPTION - DESCRIPTORS: DESCRIPTORS: ACHING

## 2022-05-27 NOTE — H&P
OBSTETRICAL HISTORY AND PHYSICAL    Provider:  Julian Watts MD   Date: 2022  Time: 9:13 AM    Patient Name: Julia Leavitt  Patient : 2000  Room/Bed: [facility]0215-    Primary Care Physician: RERE Barajas - JIMMIE      Chief Complaint:  contractions    HPI: Julia Leavitt is a 22 y.o. Devaughn Page at 38w5d weeks who presents in labor. Contractions: Yes  Rupture Of Membranes: No  Bleeding: No    Pregnancy Risk factors:  Patient Active Problem List   Diagnosis    39 weeks gestation of pregnancy    33 weeks gestation of pregnancy    Rupture of membranes with delay of delivery    Low back pain    NST (non-stress test) nonreactive    Term pregnancy    Anemia complicating pregnancy in third trimester    29 weeks gestation of pregnancy    36 weeks gestation of pregnancy    38 weeks gestation of pregnancy       Allergies: Allergies as of 2022 - Fully Reviewed 2022   Allergen Reaction Noted    No known allergies  2020       Medications:  No current facility-administered medications on file prior to encounter. Current Outpatient Medications on File Prior to Encounter   Medication Sig Dispense Refill    ondansetron (ZOFRAN ODT) 4 MG disintegrating tablet Take 1 tablet by mouth every 8 hours as needed for Nausea or Vomiting 30 tablet 1    ferrous sulfate (IRON 325) 325 (65 Fe) MG tablet Take 1 tablet by mouth 2 times daily 60 tablet 5    Prenatal Multivit-Min-Fe-FA (PRENATAL 1 + IRON PO) Take by mouth             History reviewed. No pertinent past medical history. History reviewed. No pertinent surgical history. OB History    Para Term  AB Living   3 3 3 0 0 3   SAB IAB Ectopic Molar Multiple Live Births   0 0 0 0 0 3      # Outcome Date GA Lbr Irving/2nd Weight Sex Delivery Anes PTL Lv   3 Term 22 38w5d  8 lb 5.7 oz (3.79 kg) M CS-LTranv Gen N DENTON      Complications:  Other (Comment), Prolapsed cord      Name: Allayne Neither: 8 Apgar5: 9   2 Term 06/09/21 38w6d 08:30 / 00:10 8 lb 7.8 oz (3.85 kg) M Vag-Spont None N DENTON      Name: Dorothy Market: 9  Apgar5: 9   1 Term 04/08/20 39w1d 06:27 / 00:25 8 lb 7.1 oz (3.83 kg) F Vag-Spont Spinal N DENTON      Name: Yayo Honeycutt: [address] [place] [address] West: 9       History reviewed. No pertinent family history. Social History     Socioeconomic History    Marital status: Single     Spouse name: Not on file    Number of children: Not on file    Years of education: Not on file    Highest education level: Not on file   Occupational History    Not on file   Tobacco Use    Smoking status: Never Smoker    Smokeless tobacco: Never Used   Vaping Use    Vaping Use: Never used   Substance and Sexual Activity    Alcohol use: Never    Drug use: Never    Sexual activity: Yes     Partners: Male   Other Topics Concern    Not on file   Social History Narrative    Not on file     Social Determinants of Health     Financial Resource Strain:     Difficulty of Paying Living Expenses: Not on file   Food Insecurity:     Worried About Running Out of Food in the Last Year: Not on file    Chandana of Food in the Last Year: Not on file   Transportation Needs:     Lack of Transportation (Medical): Not on file    Lack of Transportation (Non-Medical):  Not on file   Physical Activity:     Days of Exercise per Week: Not on file    Minutes of Exercise per Session: Not on file   Stress:     Feeling of Stress : Not on file   Social Connections:     Frequency of Communication with Friends and Family: Not on file    Frequency of Social Gatherings with Friends and Family: Not on file    Attends Sikhism Services: Not on file    Active Member of Clubs or Organizations: Not on file    Attends Club or Organization Meetings: Not on file    Marital Status: Not on file   Intimate Partner Violence:     Fear of Current or Ex-Partner: Not on file    Emotionally Abused: Not on file   [facility] Physically Abused: Not on file    Sexually Abused: Not on file   Housing Stability:     Unable to Pay for Housing in the Last Year: Not on file    Number of Places Lived in the Last Year: Not on file    Unstable Housing in the Last Year: Not on file       Review Of Systems:  A comprehensive review of systems was negative except for what was noted in the HPI. Physical Exam:  Vitals:    22 1356 22 1401 22 0158 22 0600   BP:  (!) 111/58  93/60   Pulse:  80  60   Resp:   16 16   Temp:    97.1 °F (36.2 °C)   TempSrc:    Temporal   SpO2: 99% 100%  100%   Weight:       Height:             General appearance:  awake, alert, cooperative, no apparent distress, and appears stated age  Neurologic:  Awake, alert, oriented to name, place and time. Motor is 5 out of 5 bilaterally. Lungs:  No increased work of breathing, good air exchange, clear to auscultation bilaterally, no crackles or wheezing  Heart:  Normal apical impulse, regular rate and rhythm  Abdomen: Gravid, soft, non-tender, non-distended    Pelvic Exam:  Cervix Check:   DILATION:  6 cm   EFFACEMENT:   70%   STATION:  -3 cm   CONSISTENCY:  medium   POSITION:  mid        Lab Results:   Blood Type/Rh:    ABO/Rh   Date Value Ref Range Status   2022 O POS  Final     Antibody Screen:    Antibody Screen   Date Value Ref Range Status   2022 NEG  Final     Hemoglobin:   Hemoglobin   Date Value Ref Range Status   2022 10.1 (L) 12.0 - 16.0 g/dL Final     Hematocrit:   Hematocrit   Date Value Ref Range Status   2022 31.2 (L) 37.0 - 47.0 % Final     Platelet Count:   Platelets   Date Value Ref Range Status   2022 196 130 - 400 K/uL Final       Assessment:  Rhoda Bucio is a 22 y.o. female   At [address] [place] [address] Avenue:  Admit for delivery  Pain management  GBS Prophylaxis  Risks, benefits, alternatives and possible complications have been discussed in detail with the patient.  Admission, and post admission procedures and expectations were discussed in detail. All questions were answered.       Estevan Ghotra MD

## 2022-05-27 NOTE — FLOWSHEET NOTE
Responded to emergency call light and found pt sitting on the floor in the doorway to bathroom leaning up against significant others legs. Pt alert and oriented. Significant other reports that he was standing behind her and she passed out. He reports lowering her to the floor and reports that she did not fall. Pt feeling better at this time and assisted to a standing position without difficulty. Nurse ambulated pt back to bed without difficulty.  B/P 96/58, HR 71, O2 sat 100% on room air, color pink, Resp even and nonlabored

## 2022-05-27 NOTE — OP NOTE
Department of Obstetrics and Gynecology   Section Note    Pt Name: Leigh Travis  MRN: [id number] Sarah #: [de-identified]  YOB: 2000  Procedure Performed By: Derek Faith MD, MD  First Assist: Brenda Montesinos    Pre-operative Diagnosis: 38 week pregnancy. Post-operative Diagnosis:  Same, Delivered, Living newbornMale  Procedure:  primary low transverse  section  Findings:  Normal tubes, ovaries and uterus. Baby Male, Apgars  8/9 and weight of 8 lbs and 5.7 ounces. Estimated Blood Loss:  520cc         Specimens: None     Complications:  None         Condition: infant stable to general nursery    Indications:     Leigh Travis is a 22 y.o. female [id number] at 44w7d who presented for   section for arm presentation with cord prolapse. She understood the  risks and benefits and signed informed consent. Procedure: The patient was taken to the Operating Room where general anesthesia was administered. She was placed in the dorsal supine position with a leftward tilt and prepped and draped. A Pfannenstiel incision was made with the scalpel taken down to the fascia. The fascia was nicked in the midline. This incision extended laterally. The underlying rectus muscle dissected bluntly and with the Gill scissors. The peritoneum identified and entered sharply. This incision extended superiorly and inferior with good visualization of the bladder. The vesicouterine peritoneum was identified and entered sharply. This incision extended laterally and the bladder flap created digitally. The uterus was incised in a low transverse fashion and extended digitally. The vertex was removed from the uterus with fundal pressure. The nose and mouth were suctioned. The rest of the baby delivered. The cord was clamped and cut and the baby was stimulated and passed off to nursery nurse. Amniotic fluid was clear. Cord blood was obtained, the placenta delivered intact with a 3 vessel cord.  The uterus was exteriorized, cleared of all clots and debris and repaired with #1 vicryl in a running locked fashion. A second imbricating layer of 1 chromic was used for uterine strength and bladder flap. The uterus was placed back into the abdomen, copious irrigation took place, hemostasis was assured with Bovie cautery. The peritoneum was closed with a 0-chromic, the fascia was inspected and found to be hemostatic and closed with stratafix. The subcutaneous tissue was irrigated, made hemostatic with Bovie cautery and closed with chromic interrupted. The skin was closed with Insorb and prineo dressing. Sponge, lap and needle counts were correct x 2. The patient tolerated the procedure well.      Electronically signed by Estevan Ghotra MD on 5/27/22 at 9:08 AM CDT

## 2022-05-27 NOTE — LACTATION NOTE
Infant Name: Baby Boy  Gestation: 38.4  Day of Life: 1  Birth weight: 8-5.7 lb (3790g)  Today's weight: 8-1.3 lb (3665g)  Weight loss: -3.3%  24 hour summary of feeds: Breastfeeding x 1, formula x 1, pumping and giving baby EBM x 2  Voids: 0  Stools: 5  Assistive device: none  Maternal History: ,   Maternal Medications: zofran, ferrous sulfate, PNV  Maternal Goal: one day at a time  Breast pump for home: faxed script to [facility] was able to go out to mom this morning shortly after midnight, mother states. States baby did breast feed once and she is continuing to pump. Encouraged mother to continue to pump with our hospital grade electric pump. Instructions for set up and cleaning given. Hand expression, breast compression encouraged to increase milk transfer while pumping. Instructed to pump for 15 mins after breastfeeding, giving baby every drop of colostrum/EBM and then formula feed as she chooses. Mother and baby will be discharged over the weekend, weight check to follow. Instructions and handouts given over management of sore nipples, engorgement, plugged ducts, mastitis, hydration, nutrition, and medications that could effect milk supply. Mother knows when to call MD if needed. Lactation number provided. All questions answered. Encouraged to call out for help if needed during the rest of her hospital stay.

## 2022-05-27 NOTE — PROGRESS NOTES
Postpartum Day 1:  Delivery    Patient is a [id number] that delivered on The patient feels well. The patient denies emotional concerns. Pain is well controlled with current medications. The baby iswell. Baby is feeding via breast. Urinary output is adequate. The patient is ambulating well. The patient is tolerating a normal diet. Flatus has been passed. Objective:      Patient Vitals for the past 8 hrs:  Vitals:    22 0600   BP: 93/60   Pulse: 60   Resp: 16   Temp: 97.1 °F (36.2 °C)   SpO2: 100%       General:    alert, appears stated age and cooperative   Bowel Sounds:  active   Lochia:  appropriate   Uterine Fundus:   firm   Incision:  healing well, no significant drainage, no dehiscence, no significant erythema   DVT Evaluation:  No evidence of DVT seen on physical exam.     Lab Results   Component Value Date    WBC 11.3 (H) 2022    HGB 10.1 (L) 2022    HCT 31.2 (L) 2022    MCV 87.2 2022     2022     Assessment:     Status post  section. Doing well postoperatively. Plan:     Continue current care.

## 2022-05-28 PROBLEM — Z3A.29 29 WEEKS GESTATION OF PREGNANCY: Status: RESOLVED | Noted: 2022-03-22 | Resolved: 2022-05-28

## 2022-05-28 PROBLEM — O28.8 NST (NON-STRESS TEST) NONREACTIVE: Status: RESOLVED | Noted: 2021-06-03 | Resolved: 2022-05-28

## 2022-05-28 PROBLEM — Z3A.36 36 WEEKS GESTATION OF PREGNANCY: Status: RESOLVED | Noted: 2022-05-12 | Resolved: 2022-05-28

## 2022-05-28 PROBLEM — Z3A.39 39 WEEKS GESTATION OF PREGNANCY: Status: RESOLVED | Noted: 2020-04-07 | Resolved: 2022-05-28

## 2022-05-28 PROBLEM — Z34.90 TERM PREGNANCY: Status: RESOLVED | Noted: 2021-06-09 | Resolved: 2022-05-28

## 2022-05-28 PROBLEM — M54.50 LOW BACK PAIN: Status: RESOLVED | Noted: 2021-06-01 | Resolved: 2022-05-28

## 2022-05-28 PROBLEM — Z3A.33 33 WEEKS GESTATION OF PREGNANCY: Status: RESOLVED | Noted: 2021-05-02 | Resolved: 2022-05-28

## 2022-05-28 PROCEDURE — 6370000000 HC RX 637 (ALT 250 FOR IP): Performed by: OBSTETRICS & GYNECOLOGY

## 2022-05-28 PROCEDURE — 99024 POSTOP FOLLOW-UP VISIT: CPT | Performed by: ADVANCED PRACTICE MIDWIFE

## 2022-05-28 PROCEDURE — 1220000000 HC SEMI PRIVATE OB R&B

## 2022-05-28 RX ORDER — IBUPROFEN 400 MG/1
800 TABLET ORAL EVERY 8 HOURS PRN
Status: DISCONTINUED | OUTPATIENT
Start: 2022-05-28 | End: 2022-05-29 | Stop reason: HOSPADM

## 2022-05-28 RX ADMIN — OXYCODONE 10 MG: 5 TABLET ORAL at 00:20

## 2022-05-28 RX ADMIN — OXYCODONE 10 MG: 5 TABLET ORAL at 17:06

## 2022-05-28 RX ADMIN — OXYCODONE 10 MG: 5 TABLET ORAL at 08:00

## 2022-05-28 RX ADMIN — OXYCODONE 10 MG: 5 TABLET ORAL at 12:30

## 2022-05-28 RX ADMIN — IBUPROFEN 800 MG: 400 TABLET ORAL at 21:22

## 2022-05-28 RX ADMIN — IBUPROFEN 800 MG: 400 TABLET ORAL at 12:30

## 2022-05-28 RX ADMIN — OXYCODONE 10 MG: 5 TABLET ORAL at 03:51

## 2022-05-28 RX ADMIN — OXYCODONE 10 MG: 5 TABLET ORAL at 21:21

## 2022-05-28 RX ADMIN — PRENATAL VIT W/ FE FUMARATE-FA TAB 27-0.8 MG 1 TABLET: 27-0.8 TAB at 07:59

## 2022-05-28 RX ADMIN — IBUPROFEN 800 MG: 400 TABLET ORAL at 03:49

## 2022-05-28 RX ADMIN — DOCUSATE SODIUM 100 MG: 100 CAPSULE, LIQUID FILLED ORAL at 21:22

## 2022-05-28 RX ADMIN — DOCUSATE SODIUM 100 MG: 100 CAPSULE, LIQUID FILLED ORAL at 07:59

## 2022-05-28 ASSESSMENT — PAIN DESCRIPTION - LOCATION
LOCATION: ABDOMEN;INCISION
LOCATION: INCISION
LOCATION: INCISION

## 2022-05-28 ASSESSMENT — PAIN DESCRIPTION - ORIENTATION
ORIENTATION: LOWER

## 2022-05-28 ASSESSMENT — PAIN SCALES - GENERAL
PAINLEVEL_OUTOF10: 8
PAINLEVEL_OUTOF10: 8
PAINLEVEL_OUTOF10: 6
PAINLEVEL_OUTOF10: 8
PAINLEVEL_OUTOF10: 6
PAINLEVEL_OUTOF10: 7

## 2022-05-28 ASSESSMENT — PAIN DESCRIPTION - DESCRIPTORS
DESCRIPTORS: CRAMPING
DESCRIPTORS: CRAMPING

## 2022-05-28 ASSESSMENT — PAIN - FUNCTIONAL ASSESSMENT
PAIN_FUNCTIONAL_ASSESSMENT: PREVENTS OR INTERFERES SOME ACTIVE ACTIVITIES AND ADLS
PAIN_FUNCTIONAL_ASSESSMENT: ACTIVITIES ARE NOT PREVENTED

## 2022-05-28 NOTE — PROGRESS NOTES
Sitting up in bed holding baby. Good bonding noted. Medicated for pain per patient request. No other needs voiced at this time.

## 2022-05-28 NOTE — PROGRESS NOTES
[facility]) OB/GYN  Progress Note    Subjective:     Postpartum Day 2:  Delivery    Patient is a [id number] that delivered on The patient feels weak. The patient denies emotional concerns. Pain is well controlled with current medications. The baby iswell. Baby is feeding via breast. Urinary output is adequate. The patient is ambulating well. The patient is tolerating a normal diet. Flatus has been passed. Objective:      Patient Vitals for the past 8 hrs:  Vitals:    22 0600   BP: (!) 88/52   Pulse: 63   Resp: 16   Temp: (!) 96.6 °F (35.9 °C)   SpO2: 100%       General:    alert, appears stated age and cooperative   Bowel Sounds:  active   Lochia:  appropriate   Uterine Fundus:   firm   Incision:  healing well, no significant drainage, no dehiscence, no significant erythema   DVT Evaluation:  No evidence of DVT seen on physical exam.     Lab Results   Component Value Date    WBC 11.3 (H) 2022    HGB 10.1 (L) 2022    HCT 31.2 (L) 2022    MCV 87.2 2022     2022     Assessment:     Status post  section. Doing well postoperatively. Plan:     Continue current care. Over 50% of the total visit time of 20 minutes was spent on counseling and/or coordination of care of post partum course, importance of ambulation and incentive spirometry, pain control,  feeding, and wound care. All questions were answered and the patient voiced understanding.

## 2022-05-28 NOTE — PROGRESS NOTES
Medicated for pain per patient request with motrin and roxicodone. Up ad nely in room without difficulty. Voiding. Handles and bonds well with baby. No other needs voiced at this time.

## 2022-05-29 VITALS
BODY MASS INDEX: 29.99 KG/M2 | DIASTOLIC BLOOD PRESSURE: 61 MMHG | WEIGHT: 180 LBS | HEART RATE: 82 BPM | TEMPERATURE: 97 F | HEIGHT: 65 IN | SYSTOLIC BLOOD PRESSURE: 108 MMHG | RESPIRATION RATE: 16 BRPM | OXYGEN SATURATION: 100 %

## 2022-05-29 PROCEDURE — 6370000000 HC RX 637 (ALT 250 FOR IP): Performed by: OBSTETRICS & GYNECOLOGY

## 2022-05-29 PROCEDURE — 99024 POSTOP FOLLOW-UP VISIT: CPT | Performed by: ADVANCED PRACTICE MIDWIFE

## 2022-05-29 RX ORDER — OXYCODONE HYDROCHLORIDE 5 MG/1
5 TABLET ORAL EVERY 4 HOURS PRN
Qty: 18 TABLET | Refills: 0 | Status: SHIPPED | OUTPATIENT
Start: 2022-05-29 | End: 2022-06-01

## 2022-05-29 RX ORDER — IBUPROFEN 800 MG/1
800 TABLET ORAL EVERY 8 HOURS PRN
Qty: 30 TABLET | Refills: 0 | Status: SHIPPED | OUTPATIENT
Start: 2022-05-29 | End: 2022-06-08

## 2022-05-29 RX ADMIN — OXYCODONE 10 MG: 5 TABLET ORAL at 14:59

## 2022-05-29 RX ADMIN — IBUPROFEN 800 MG: 400 TABLET ORAL at 06:18

## 2022-05-29 RX ADMIN — IBUPROFEN 800 MG: 400 TABLET ORAL at 14:59

## 2022-05-29 RX ADMIN — OXYCODONE 10 MG: 5 TABLET ORAL at 10:20

## 2022-05-29 RX ADMIN — OXYCODONE 10 MG: 5 TABLET ORAL at 01:25

## 2022-05-29 RX ADMIN — OXYCODONE 10 MG: 5 TABLET ORAL at 06:18

## 2022-05-29 RX ADMIN — DOCUSATE SODIUM 100 MG: 100 CAPSULE, LIQUID FILLED ORAL at 08:29

## 2022-05-29 RX ADMIN — PRENATAL VIT W/ FE FUMARATE-FA TAB 27-0.8 MG 1 TABLET: 27-0.8 TAB at 08:29

## 2022-05-29 ASSESSMENT — PAIN SCALES - GENERAL
PAINLEVEL_OUTOF10: 8
PAINLEVEL_OUTOF10: 7
PAINLEVEL_OUTOF10: 8
PAINLEVEL_OUTOF10: 6

## 2022-05-29 NOTE — DISCHARGE SUMMARY
[facility]   Discharge Summary    Patient Name: Domingo Garcia  Patient : 2000  Room/Bed: [facility]/0215-  Primary Care Physician: RERE Gilmore NP  Admit Date: 2022 11:19 AM    Reasons for Admission on 2022 11:19 AM  38 weeks gestation of pregnancy [Z3A.38]  No comment available  Onset of Labor    Patient Active Problem List   Diagnosis    Rupture of membranes with delay of delivery    Anemia complicating pregnancy in third trimester    38 weeks gestation of pregnancy       Domingo Garcia is a 22y.o. year old  who presented at 44w7d gestation with Back Pain  . Her pregnancy has been complicated by anemia   Please see H&P for detailed information. She was admitted and progressed in labor with pitocin augmentation. Prolapsed cord was noted after AROM. Pt was taken to OR for emergency c/s and general anesthesia. Please see procedure note for detailed information. Her postpartum course has been unremarkable. See H&H history below. She had no signs or symptoms of acute blood loss anemia. She is ambulating well, voiding without difficulty and her lochia is within normal limits. She is feeding by breast without difficulty. She was stable for discharge on postpartum day 3. Hemoglobin   Date Value Ref Range Status   2022 10.1 (L) 12.0 - 16.0 g/dL Final   2022 12.5 12.0 - 16.0 g/dL Final     Hematocrit   Date Value Ref Range Status   2022 31.2 (L) 37.0 - 47.0 % Final   2022 38.4 37.0 - 47.0 % Final         Prenatal Procedures  None    Intrapartum Procedures   Delivery: : Low Cervical, Transverse    Postpartum Procedures  None     Data  Information for the patient's :  Epimenio Merck [[id number]]   male   Birth Weight: 8 lb 5.7 oz (3.79 kg)         Postpartum Day 3: Vaginal      REVIEW OF SYSTEMS  Patient is a [id number] The patient feels well. The patient denies emotional concerns.  Pain is well controlled with current medications. The baby iswell. Baby is feeding via breast. Urinary output is adequate. The patient is ambulating well. The patient is tolerating a normal diet. PHYSICAL EXAM     /61   Pulse 82   Temp 97 °F (36.1 °C) (Temporal)   Resp 16   Ht 5' 5\" (1.651 m)   Wt 180 lb (81.6 kg)   LMP 08/28/2021 (Exact Date)   SpO2 100%   Breastfeeding Unknown   BMI 29.95 kg/m²   General:    alert, appears stated age and cooperative   Breast:  Soft, non-tender   Bowel Sounds:  active   Lochia:  appropriate   Uterine Fundus:   firm   Episiotomy/lac:  healing well, no significant drainage, no dehiscence, no significant erythema   DVT Evaluation:  No evidence of DVT seen on physical exam.     Lab Results   Component Value Date    WBC 11.3 (H) 05/27/2022    HGB 10.1 (L) 05/27/2022    HCT 31.2 (L) 05/27/2022    MCV 87.2 05/27/2022     05/27/2022         DISCHARGE DIAGNOSIS:  Primary c/s  Prolapsed cord    Discharge Information/Patient Instructions:     Medication List      START taking these medications    ibuprofen 800 MG tablet  Commonly known as: ADVIL;MOTRIN  Take 1 tablet by mouth every 8 hours as needed for Pain     oxyCODONE 5 MG immediate release tablet  Commonly known as: ROXICODONE  Take 1 tablet by mouth every 4 hours as needed for Pain for up to 3 days.         CONTINUE taking these medications    ferrous sulfate 325 (65 Fe) MG tablet  Commonly known as: IRON 325  Take 1 tablet by mouth 2 times daily     PRENATAL 1 + IRON PO        STOP taking these medications    ondansetron 4 MG disintegrating tablet  Commonly known as: Zofran ODT           Where to Get Your Medications      These medications were sent to [facility]/pharmacy #[id number], [address] [place] [address]    Hours: 24-hours Phone: [phone]   · ibuprofen 800 MG tablet  · oxyCODONE 5 MG immediate release tablet         No discharge procedures on file.    Activity & Diet: Precautions and instructions were discussed with her including but not limited to maintaining a regular diet at home, practicing local hygiene, pelvic rest, and signs and symptoms to report including heavy bleeding, frequent passage of clots, fainting or dizziness, foul odor of lochia, increased pain, fever, or any other concerns. Wound Care: N/A    Discharge to: Home in stable condition  She was asked to follow up in the office in 2 weeks. Discharge Date: 5/29/2022      RERE Odom CNM      Comments: Follow-up care and birth control were reviewed. Signs and symptoms of mastitis and Post Partum Depression were reviewed. The patient is to notify her physician if any of these occur. The patient was counseled on secondary smoke risks and the increased risk of sudden infant death syndrome and respiratory problems to her baby with exposure. She was counseled on various alternate recommendations to decrease the exposure to secondary smoke to her children. Total time spent on discharge and coordination of care is >30 minutes. All questions were answered and the patient voiced understanding.

## 2022-05-29 NOTE — PROGRESS NOTES
Medicated for pain with roxicodone per patient request. Up ad nely in room without difficulty. Baby rooming in. Good bonding noted. No other needs voiced at this time.

## 2022-05-31 ENCOUNTER — HOSPITAL ENCOUNTER (OUTPATIENT)
Dept: LABOR AND DELIVERY | Age: 22
Discharge: HOME | End: 2022-05-31
Payer: MEDICAID

## 2022-05-31 PROCEDURE — S9443 LACTATION CLASS: HCPCS

## 2022-06-08 ENCOUNTER — POSTPARTUM VISIT (OUTPATIENT)
Dept: OBGYN CLINIC | Age: 22
End: 2022-06-08
Payer: MEDICAID

## 2022-06-08 VITALS
HEART RATE: 89 BPM | WEIGHT: 162 LBS | BODY MASS INDEX: 26.99 KG/M2 | HEIGHT: 65 IN | SYSTOLIC BLOOD PRESSURE: 134 MMHG | DIASTOLIC BLOOD PRESSURE: 80 MMHG

## 2022-06-08 PROCEDURE — 99213 OFFICE O/P EST LOW 20 MIN: CPT | Performed by: OBSTETRICS & GYNECOLOGY

## 2022-06-08 ASSESSMENT — ENCOUNTER SYMPTOMS
EYES NEGATIVE: 1
GASTROINTESTINAL NEGATIVE: 1
RESPIRATORY NEGATIVE: 1

## 2022-06-08 NOTE — PATIENT INSTRUCTIONS
Patient Education         Section: What to Expect at Home  Your Recovery     A  section, or , is surgery to deliver your baby through a cut that the doctor makes in your lower belly and uterus. The cut is called anincision. You may have some pain in your lower belly and need pain medicine for 1 to 2 weeks. You can expect some vaginal bleeding for several weeks. You willprobably need about 6 weeks to fully recover. It's important to take it easy while the incision heals. Avoid heavy lifting, strenuous activities, and exercises that strain the belly muscles while you recover. Ask a family member or friend for help with housework, cooking, andshopping. This care sheet gives you a general idea about how long it will take for you to recover. But each person recovers at a different pace. Follow the steps belowto get better as quickly as possible. How can you care for yourself at home? Activity     Rest when you feel tired. Getting enough sleep will help you recover.      Try to walk each day. Start by walking a little more than you did the day before. Bit by bit, increase the amount you walk. Walking boosts blood flow and helps prevent pneumonia, constipation, and blood clots.      Avoid strenuous activities, such as bicycle riding, jogging, weightlifting, and aerobic exercise, for 6 weeks or until your doctor says it is okay.      Until your doctor says it is okay, do not lift anything heavier than your baby.      Do not do sit-ups or other exercises that strain the belly muscles for 6 weeks or until your doctor says it is okay.      Hold a pillow over your incision when you cough or take deep breaths. This will support your belly and decrease your pain.      You may shower as usual. Pat the incision dry when you are done.      You will have some vaginal bleeding. Wear sanitary pads.  Do not douche or use tampons until your doctor says it is okay.      Ask your doctor when you can drive again.      You will probably need to take at least 6 weeks off work. It depends on the type of work you do and how you feel.      Ask your doctor when it is okay for you to have sex. Diet     You can eat your normal diet. If your stomach is upset, try bland, low-fat foods like plain rice, broiled chicken, toast, and yogurt.      Drink plenty of fluids (unless your doctor tells you not to).      You may notice that your bowel movements are not regular right after your surgery. This is common. Try to avoid constipation and straining with bowel movements. You may want to take a fiber supplement every day. If you have not had a bowel movement after a couple of days, ask your doctor about taking a mild laxative.      If you are breastfeeding, limit alcohol. Alcohol can cause a lack of energy and other health problems for the baby when a breastfeeding woman drinks heavily. It can also get in the way of a mom's ability to feed her baby or to care for the child in other ways. There isn't a lot of research about exactly how much alcohol can harm a baby. Having no alcohol is the safest choice for your baby. If you choose to have a drink now and then, have only one drink, and limit the number of occasions that you have a drink. Wait to breastfeed at least 2 hours after you have a drink to reduce the amount of alcohol the baby may get in the milk. Medicines     Your doctor will tell you if and when you can restart your medicines. You will also get instructions about taking any new medicines.      If you take aspirin or some other blood thinner, ask your doctor if and when to start taking it again. Make sure that you understand exactly what your doctor wants you to do.      Take pain medicines exactly as directed. ? If the doctor gave you a prescription medicine for pain, take it as prescribed.   ? If you are not taking a prescription pain medicine, ask your doctor if you can take an over-the-counter loose stitches, or your incision comes open.      You have signs of hemorrhage (too much bleeding), such as:  ? Heavy vaginal bleeding. This means that you are soaking through one or more pads in an hour. Or you pass blood clots bigger than an egg. ? Feeling dizzy or lightheaded, or you feel like you may faint. ? Feeling so tired or weak that you cannot do your usual activities. ? A fast or irregular heartbeat. ? New or worse belly pain.      You have symptoms of infection, such as:  ? Increased pain, swelling, warmth, or redness. ? Red streaks leading from the incision. ? Pus draining from the incision. ? A fever. ? Vaginal discharge that smells bad.  ? New or worse belly pain.      You have symptoms of a blood clot in your leg (called a deep vein thrombosis), such as:  ? Pain in your calf, back of the knee, thigh, or groin. ? Redness and swelling in your leg or groin.      You have signs of preeclampsia, such as:  ? Sudden swelling of your face, hands, or feet. ? New vision problems (such as dimness, blurring, or seeing spots). ? A severe headache. Watch closely for changes in your health, and be sure to contact your doctor if:     Your vaginal bleeding isn't decreasing.      You feel sad, anxious, or hopeless for more than a few days.      You are having problems with your breasts or breastfeeding. Where can you learn more? Go to https://[employer]. org and sign in to your [employer] account. Enter M806 in the [employer] box to learn more about \" Section: What to Expect at Home. \"     If you do not have an account, please click on the \"Sign Up Now\" link. Current as of: 2021               Content Version: 13.2  © 1425-7282 [employer]. Care instructions adapted under license by [facility] ([facility]).  If you have questions about a medical condition or this instruction, always ask your healthcare professional. Jaswinder Ponce

## 2022-06-08 NOTE — PROGRESS NOTES
Martha CONNELLY RN, am scribing for and in the presence of Dr. Garcia Iniguez 2022/10:16 AM/sign         2022    Glen Cristina (:  2000) is a 22 y.o. female, here for a 2 week postpartum visit. She had a  on 2022. She is breastfeeding. Denies any PPD. Patient Active Problem List   Diagnosis    Rupture of membranes with delay of delivery    Anemia complicating pregnancy in third trimester    38 weeks gestation of pregnancy       Review of Systems   Constitutional: Negative. HENT: Negative. Eyes: Negative. Respiratory: Negative. Cardiovascular: Negative. Gastrointestinal: Negative. Genitourinary: Negative for difficulty urinating, dyspareunia, dysuria, enuresis, frequency, hematuria, menstrual problem, pelvic pain, urgency and vaginal discharge. Musculoskeletal: Negative. Skin: Negative. Neurological: Negative. Psychiatric/Behavioral: Negative. Prior to Visit Medications    Medication Sig Taking? Authorizing Provider   Prenatal Multivit-Min-Fe-FA (PRENATAL 1 + IRON PO) Take by mouth Yes Historical Provider, MD        Allergies   Allergen Reactions    No Known Allergies        No past medical history on file.     Past Surgical History:   Procedure Laterality Date     SECTION N/A 2022     SECTION performed by Mar Huff MD at [address] [place] [address] L&D OR       Social History     Socioeconomic History    Marital status: Single     Spouse name: Not on file    Number of children: Not on file    Years of education: Not on file    Highest education level: Not on file   Occupational History    Not on file   Tobacco Use    Smoking status: Never Smoker    Smokeless tobacco: Never Used   Vaping Use    Vaping Use: Never used   Substance and Sexual Activity    Alcohol use: Never    Drug use: Never    Sexual activity: Yes     Partners: Male   Other Topics Concern    Not on file   Social History Narrative    Not on file     Social Determinants of Health     Financial Resource Strain:     Difficulty of Paying Living Expenses: Not on file   Food Insecurity:     Worried About Running Out of Food in the Last Year: Not on file    Chandana of Food in the Last Year: Not on file   Transportation Needs:     Lack of Transportation (Medical): Not on file    Lack of Transportation (Non-Medical): Not on file   Physical Activity:     Days of Exercise per Week: Not on file    Minutes of Exercise per Session: Not on file   Stress:     Feeling of Stress : Not on file   Social Connections:     Frequency of Communication with Friends and Family: Not on file    Frequency of Social Gatherings with Friends and Family: Not on file    Attends Religion Services: Not on file    Active Member of [address] [place] [address] [employer] or Organizations: Not on file    Attends Club or Organization Meetings: Not on file    Marital Status: Not on file   Intimate Partner Violence:     Fear of Current or Ex-Partner: Not on file    Emotionally Abused: Not on file    Physically Abused: Not on file    Sexually Abused: Not on file   Housing Stability:     Unable to Pay for Housing in the Last Year: Not on file    Number of [place] in the Last Year: Not on file    Unstable Housing in the Last Year: Not on file        No family history on file. ADVANCE DIRECTIVE: N, <no information>    Vitals:    06/08/22 0945   BP: 134/80   Site: Left Upper Arm   Position: Sitting   Cuff Size: Medium Adult   Pulse: 89   Weight: 162 lb (73.5 kg)   Height: 5' 5\" (1.651 m)     Estimated body mass index is 26.96 kg/m² as calculated from the following:    Height as of this encounter: 5' 5\" (1.651 m). Weight as of this encounter: 162 lb (73.5 kg). Physical Exam  Vitals and nursing note reviewed. Constitutional:       General: She is not in acute distress. Appearance: She is well-developed. She is not diaphoretic. HENT:      Head: Normocephalic and atraumatic.    Eyes:      Conjunctiva/sclera: Conjunctivae normal.      Pupils: Pupils are equal, round, and reactive to light. Pulmonary:      Effort: Pulmonary effort is normal.   Abdominal:      Tenderness: There is no guarding. Comments: prineo dressing removed. Incision healing well    Musculoskeletal:         General: Normal range of motion. Cervical back: Normal range of motion. Comments: Normal ROM in all 4 extremities; normal gait   Skin:     General: Skin is warm and dry. Neurological:      Mental Status: She is alert and oriented to person, place, and time. Motor: No abnormal muscle tone. Coordination: Coordination normal.   Psychiatric:         Behavior: Behavior normal.         No flowsheet data found. Lab Results   Component Value Date    GLUCOSE 106 05/02/2022       The ASCVD Risk score (Cliff Pacheco., et al., 2013) failed to calculate for the following reasons:     The 2013 ASCVD risk score is only valid for ages 36 to 78    Immunization History   Administered Date(s) Administered    Influenza, MDCK Quadv, IM, PF (Flucelvax 2 yrs and older) 12/08/2021    Tdap (Boostrix, Adacel) 02/25/2020       Health Maintenance   Topic Date Due    Varicella vaccine (1 of 2 - 2-dose childhood series) Never done    COVID-19 Vaccine (1) Never done    HPV vaccine (1 - 2-dose series) Never done    Depression Screen  Never done    HIV screen  Never done    Chlamydia screen  11/09/2022    DTaP/Tdap/Td vaccine (2 - Td or Tdap) 02/25/2030    Flu vaccine  Completed    Hepatitis C screen  Completed    Hepatitis A vaccine  Aged Out    Hepatitis B vaccine  Aged Out    Hib vaccine  Aged Out    Meningococcal (ACWY) vaccine  Aged Out    Pneumococcal 0-64 years Vaccine  Aged Out       Assessment & Plan   Follow-up, postpartum, routine    Progesterone only BC options discussed and pt will try Miradelaida  Will pace at next visit  All questions answered     Return in about 4 weeks (around 7/6/2022), or if symptoms worsen or fail to improve, for PP. Alberto Basilio MD, personally performed services described in this document as scribed by Murali Bautista RN in my presence, and it is both accurate and complete.

## 2022-07-06 ENCOUNTER — POSTPARTUM VISIT (OUTPATIENT)
Dept: OBGYN CLINIC | Age: 22
End: 2022-07-06
Payer: MEDICAID

## 2022-07-06 VITALS
HEART RATE: 72 BPM | BODY MASS INDEX: 26.66 KG/M2 | HEIGHT: 65 IN | WEIGHT: 160 LBS | SYSTOLIC BLOOD PRESSURE: 121 MMHG | DIASTOLIC BLOOD PRESSURE: 78 MMHG

## 2022-07-06 DIAGNOSIS — Z30.430 ENCOUNTER FOR IUD INSERTION: ICD-10-CM

## 2022-07-06 PROCEDURE — 58300 INSERT INTRAUTERINE DEVICE: CPT | Performed by: OBSTETRICS & GYNECOLOGY

## 2022-07-06 PROCEDURE — 99213 OFFICE O/P EST LOW 20 MIN: CPT | Performed by: OBSTETRICS & GYNECOLOGY

## 2022-07-06 ASSESSMENT — ENCOUNTER SYMPTOMS
EYES NEGATIVE: 1
RESPIRATORY NEGATIVE: 1
GASTROINTESTINAL NEGATIVE: 1

## 2022-07-06 NOTE — PATIENT INSTRUCTIONS
Patient Education         Section: What to Expect at Home  Your Recovery     A  section, or , is surgery to deliver your baby through a cut that the doctor makes in your lower belly and uterus. The cut is called anincision. You may have some pain in your lower belly and need pain medicine for 1 to 2 weeks. You can expect some vaginal bleeding for several weeks. You willprobably need about 6 weeks to fully recover. It's important to take it easy while the incision heals. Avoid heavy lifting, strenuous activities, and exercises that strain the belly muscles while you recover. Ask a family member or friend for help with housework, cooking, andshopping. This care sheet gives you a general idea about how long it will take for you to recover. But each person recovers at a different pace. Follow the steps belowto get better as quickly as possible. How can you care for yourself at home? Activity     Rest when you feel tired. Getting enough sleep will help you recover.      Try to walk each day. Start by walking a little more than you did the day before. Bit by bit, increase the amount you walk. Walking boosts blood flow and helps prevent pneumonia, constipation, and blood clots.      Avoid strenuous activities, such as bicycle riding, jogging, weightlifting, and aerobic exercise, for 6 weeks or until your doctor says it is okay.      Until your doctor says it is okay, do not lift anything heavier than your baby.      Do not do sit-ups or other exercises that strain the belly muscles for 6 weeks or until your doctor says it is okay.      Hold a pillow over your incision when you cough or take deep breaths. This will support your belly and decrease your pain.      You may shower as usual. Pat the incision dry when you are done.      You will have some vaginal bleeding. Wear sanitary pads.  Do not douche or use tampons until your doctor says it is okay.      Ask your doctor when you can drive again.      You will probably need to take at least 6 weeks off work. It depends on the type of work you do and how you feel.      Ask your doctor when it is okay for you to have sex. Diet     You can eat your normal diet. If your stomach is upset, try bland, low-fat foods like plain rice, broiled chicken, toast, and yogurt.      Drink plenty of fluids (unless your doctor tells you not to).      You may notice that your bowel movements are not regular right after your surgery. This is common. Try to avoid constipation and straining with bowel movements. You may want to take a fiber supplement every day. If you have not had a bowel movement after a couple of days, ask your doctor about taking a mild laxative.      If you are breastfeeding, limit alcohol. Alcohol can cause a lack of energy and other health problems for the baby when a breastfeeding woman drinks heavily. It can also get in the way of a mom's ability to feed her baby or to care for the child in other ways. There isn't a lot of research about exactly how much alcohol can harm a baby. Having no alcohol is the safest choice for your baby. If you choose to have a drink now and then, have only one drink, and limit the number of occasions that you have a drink. Wait to breastfeed at least 2 hours after you have a drink to reduce the amount of alcohol the baby may get in the milk. Medicines     Your doctor will tell you if and when you can restart your medicines. You will also get instructions about taking any new medicines.      If you take aspirin or some other blood thinner, ask your doctor if and when to start taking it again. Make sure that you understand exactly what your doctor wants you to do.      Take pain medicines exactly as directed. ? If the doctor gave you a prescription medicine for pain, take it as prescribed.   ? If you are not taking a prescription pain medicine, ask your doctor if you can take an over-the-counter medicine.      If you think your pain medicine is making you sick to your stomach:  ? Take your medicine after meals (unless your doctor has told you not to). ? Ask your doctor for a different pain medicine.      If your doctor prescribed antibiotics, take them as directed. Do not stop taking them just because you feel better. You need to take the full course of antibiotics. Incision care     If you have strips of tape on the incision, leave the tape on for a week or until it falls off.      Wash the area daily with warm, soapy water, and pat it dry. Don't use hydrogen peroxide or alcohol, which can slow healing. You may cover the area with a gauze bandage if it weeps or rubs against clothing. Change the bandage every day.      Keep the area clean and dry. Other instructions     If you breastfeed your baby, you may be more comfortable while you are healing if you don't rest your baby on your belly. Try tucking your baby under your arm, with your baby's body along the side you will be feeding on. Support your baby's upper body with your arm. With that hand you can control your baby's head to bring your baby's mouth to your breast. This is sometimes called the football hold. Follow-up care is a key part of your treatment and safety. Be sure to make and go to all appointments, and call your doctor if you are having problems. It's also a good idea to know your test results and keep alist of the medicines you take. When should you call for help? Share this information with your partner, family, or a friend. They can help you watch for warning signs. Call 911 anytime you think you may need emergency care. For example, call if:     You have thoughts of harming yourself, your baby, or another person.      You passed out (lost consciousness).      You have chest pain, are short of breath, or cough up blood.      You have a seizure.    Call your doctor now or seek immediate medical care if:     You have loose stitches, or your incision comes open.      You have signs of hemorrhage (too much bleeding), such as:  ? Heavy vaginal bleeding. This means that you are soaking through one or more pads in an hour. Or you pass blood clots bigger than an egg. ? Feeling dizzy or lightheaded, or you feel like you may faint. ? Feeling so tired or weak that you cannot do your usual activities. ? A fast or irregular heartbeat. ? New or worse belly pain.      You have symptoms of infection, such as:  ? Increased pain, swelling, warmth, or redness. ? Red streaks leading from the incision. ? Pus draining from the incision. ? A fever. ? Vaginal discharge that smells bad.  ? New or worse belly pain.      You have symptoms of a blood clot in your leg (called a deep vein thrombosis), such as:  ? Pain in your calf, back of the knee, thigh, or groin. ? Redness and swelling in your leg or groin.      You have signs of preeclampsia, such as:  ? Sudden swelling of your face, hands, or feet. ? New vision problems (such as dimness, blurring, or seeing spots). ? A severe headache. Watch closely for changes in your health, and be sure to contact your doctor if:     Your vaginal bleeding isn't decreasing.      You feel sad, anxious, or hopeless for more than a few days.      You are having problems with your breasts or breastfeeding. Where can you learn more? Go to https://PBS-BiopeCognotion.3PointData. org and sign in to your [employer] account. Enter M806 in the [employer] box to learn more about \" Section: What to Expect at Home. \"     If you do not have an account, please click on the \"Sign Up Now\" link. Current as of: 2022               Content Version: 13.3   Healthwise, Incorporated. Care instructions adapted under license by [facility] ([facility]).  If you have questions about a medical condition or this instruction, always ask your healthcare professional. Priscilla Iniguez tampons until your doctor says it is okay.      Ask your doctor when you can drive again.      You will probably need to take at least 6 weeks off work. It depends on the type of work you do and how you feel.      Ask your doctor when it is okay for you to have sex. Diet     You can eat your normal diet. If your stomach is upset, try bland, low-fat foods like plain rice, broiled chicken, toast, and yogurt.      Drink plenty of fluids (unless your doctor tells you not to).      You may notice that your bowel movements are not regular right after your surgery. This is common. Try to avoid constipation and straining with bowel movements. You may want to take a fiber supplement every day. If you have not had a bowel movement after a couple of days, ask your doctor about taking a mild laxative.      If you are breastfeeding, limit alcohol. Alcohol can cause a lack of energy and other health problems for the baby when a breastfeeding woman drinks heavily. It can also get in the way of a mom's ability to feed her baby or to care for the child in other ways. There isn't a lot of research about exactly how much alcohol can harm a baby. Having no alcohol is the safest choice for your baby. If you choose to have a drink now and then, have only one drink, and limit the number of occasions that you have a drink. Wait to breastfeed at least 2 hours after you have a drink to reduce the amount of alcohol the baby may get in the milk. Medicines     Your doctor will tell you if and when you can restart your medicines. You will also get instructions about taking any new medicines.      If you take aspirin or some other blood thinner, ask your doctor if and when to start taking it again. Make sure that you understand exactly what your doctor wants you to do.      Take pain medicines exactly as directed. ? If the doctor gave you a prescription medicine for pain, take it as prescribed.   ? If you are not taking a prescription pain medicine, ask your doctor if you can take an over-the-counter medicine.      If you think your pain medicine is making you sick to your stomach:  ? Take your medicine after meals (unless your doctor has told you not to). ? Ask your doctor for a different pain medicine.      If your doctor prescribed antibiotics, take them as directed. Do not stop taking them just because you feel better. You need to take the full course of antibiotics. Incision care     If you have strips of tape on the incision, leave the tape on for a week or until it falls off.      Wash the area daily with warm, soapy water, and pat it dry. Don't use hydrogen peroxide or alcohol, which can slow healing. You may cover the area with a gauze bandage if it weeps or rubs against clothing. Change the bandage every day.      Keep the area clean and dry. Other instructions     If you breastfeed your baby, you may be more comfortable while you are healing if you don't rest your baby on your belly. Try tucking your baby under your arm, with your baby's body along the side you will be feeding on. Support your baby's upper body with your arm. With that hand you can control your baby's head to bring your baby's mouth to your breast. This is sometimes called the football hold. Follow-up care is a key part of your treatment and safety. Be sure to make and go to all appointments, and call your doctor if you are having problems. It's also a good idea to know your test results and keep alist of the medicines you take. When should you call for help? Share this information with your partner, family, or a friend. They can help you watch for warning signs. Call 911 anytime you think you may need emergency care.  For example, call if:     You have thoughts of harming yourself, your baby, or another person.      You passed out (lost consciousness).      You have chest pain, are short of breath, or cough up blood.      You have a seizure. Call your doctor now or seek immediate medical care if:     You have loose stitches, or your incision comes open.      You have signs of hemorrhage (too much bleeding), such as:  ? Heavy vaginal bleeding. This means that you are soaking through one or more pads in an hour. Or you pass blood clots bigger than an egg. ? Feeling dizzy or lightheaded, or you feel like you may faint. ? Feeling so tired or weak that you cannot do your usual activities. ? A fast or irregular heartbeat. ? New or worse belly pain.      You have symptoms of infection, such as:  ? Increased pain, swelling, warmth, or redness. ? Red streaks leading from the incision. ? Pus draining from the incision. ? A fever. ? Vaginal discharge that smells bad.  ? New or worse belly pain.      You have symptoms of a blood clot in your leg (called a deep vein thrombosis), such as:  ? Pain in your calf, back of the knee, thigh, or groin. ? Redness and swelling in your leg or groin.      You have signs of preeclampsia, such as:  ? Sudden swelling of your face, hands, or feet. ? New vision problems (such as dimness, blurring, or seeing spots). ? A severe headache. Watch closely for changes in your health, and be sure to contact your doctor if:     Your vaginal bleeding isn't decreasing.      You feel sad, anxious, or hopeless for more than a few days.      You are having problems with your breasts or breastfeeding. Where can you learn more? Go to https://CU[employer] Computinganthony.healthTerritorial Prescience. org and sign in to your [employer] account. Enter M806 in the [facility] box to learn more about \" Section: What to Expect at Home. \"     If you do not have an account, please click on the \"Sign Up Now\" link. Current as of: 2022               Content Version: 13.3   Healthwise, Incorporated. Care instructions adapted under license by [facility] ([facility]).  If you have questions about a medical condition or this instruction, always ask your healthcare professional. [place] [address] any warranty or liability for your use of this information.

## 2022-07-06 NOTE — PROGRESS NOTES
Martha CONNELLY RN, am scribing for and in the presence of Dr. Awan  2022/11:23 AM/sign         2022    Susie Rayo (:  2000) is a 22 y.o. female, here for a 6 wk postpartum check up. She had a  on 22. She is breastfeeding. Denies any PPD. She is also needing her IUD inserted. Patient Active Problem List   Diagnosis    Rupture of membranes with delay of delivery    Anemia complicating pregnancy in third trimester    38 weeks gestation of pregnancy       Review of Systems   Constitutional: Negative. HENT: Negative. Eyes: Negative. Respiratory: Negative. Cardiovascular: Negative. Gastrointestinal: Negative. Genitourinary: Negative for difficulty urinating, dyspareunia, dysuria, enuresis, frequency, hematuria, menstrual problem, pelvic pain, urgency and vaginal discharge. Musculoskeletal: Negative. Skin: Negative. Neurological: Negative. Psychiatric/Behavioral: Negative. Prior to Visit Medications    Medication Sig Taking? Authorizing Provider   Prenatal Multivit-Min-Fe-FA (PRENATAL 1 + IRON PO) Take by mouth  Historical Provider, MD        Allergies   Allergen Reactions    No Known Allergies        History reviewed. No pertinent past medical history.     Past Surgical History:   Procedure Laterality Date     SECTION N/A 2022     SECTION performed by Scott Berman MD at [address] [place] [address] L&D OR       Social History     Socioeconomic History    Marital status: Single     Spouse name: Not on file    Number of children: Not on file    Years of education: Not on file    Highest education level: Not on file   Occupational History    Not on file   Tobacco Use    Smoking status: Never Smoker    Smokeless tobacco: Never Used   Vaping Use    Vaping Use: Never used   Substance and Sexual Activity    Alcohol use: Never    Drug use: Never    Sexual activity: Yes     Partners: Male   Other Topics Concern    Not on file Social History Narrative    Not on file     Social Determinants of Health     Financial Resource Strain:     Difficulty of Paying Living Expenses: Not on file   Food Insecurity:     Worried About Running Out of Food in the Last Year: Not on file    Chandana of Food in the Last Year: Not on file   Transportation Needs:     Lack of Transportation (Medical): Not on file    Lack of Transportation (Non-Medical): Not on file   Physical Activity:     Days of Exercise per Week: Not on file    Minutes of Exercise per Session: Not on file   Stress:     Feeling of Stress : Not on file   Social Connections:     Frequency of Communication with Friends and Family: Not on file    Frequency of Social Gatherings with Friends and Family: Not on file    Attends Spiritism Services: Not on file    Active Member of [address] [place] [address] [employer] or Organizations: Not on file    Attends Club or Organization Meetings: Not on file    Marital Status: Not on file   Intimate Partner Violence:     Fear of Current or Ex-Partner: Not on file    Emotionally Abused: Not on file    Physically Abused: Not on file    Sexually Abused: Not on file   Housing Stability:     Unable to Pay for Housing in the Last Year: Not on file    Number of [place] in the Last Year: Not on file    Unstable Housing in the Last Year: Not on file        History reviewed. No pertinent family history. ADVANCE DIRECTIVE: N, <no information>    Vitals:    07/06/22 1031   BP: 121/78   Site: Right Upper Arm   Position: Sitting   Cuff Size: Medium Adult   Pulse: 72   Weight: 160 lb (72.6 kg)   Height: 5' 5\" (1.651 m)     Estimated body mass index is 26.63 kg/m² as calculated from the following:    Height as of this encounter: 5' 5\" (1.651 m). Weight as of this encounter: 160 lb (72.6 kg). Physical Exam  Vitals and nursing note reviewed. Constitutional:       General: She is not in acute distress. Appearance: She is well-developed. She is not diaphoretic.    HENT: Head: Normocephalic and atraumatic. Eyes:      Conjunctiva/sclera: Conjunctivae normal.      Pupils: Pupils are equal, round, and reactive to light. Pulmonary:      Effort: Pulmonary effort is normal.   Abdominal:      Tenderness: There is no guarding. Musculoskeletal:         General: Normal range of motion. Cervical back: Normal range of motion. Comments: Normal ROM in all 4 extremities; normal gait   Skin:     General: Skin is warm and dry. Neurological:      Mental Status: She is alert and oriented to person, place, and time. Motor: No abnormal muscle tone. Coordination: Coordination normal.   Psychiatric:         Behavior: Behavior normal.     IUD Insertion Procedure Note    Pre-operative Diagnosis: cycle control    Post-operative Diagnosis: same    Indications: undesired fertility and cycle control     Procedure Details    Urine pregnancy test was not done. The risks (including infection, bleeding, pain, and uterine perforation) and benefits of the procedure were explained to the patient and Written informed consent was obtained. Cervix cleansed with Betadine. Uterus sounded to 9 cm. IUD inserted without difficulty. String visible and trimmed. Patient tolerated procedure well. IUD Information:  Mirena. Condition:  Stable    Complications:  None    Plan:    The patient was advised to call for any fever or for prolonged or severe pain or bleeding. She was advised to use OTC ibuprofen as needed for mild to moderate pain. Attending Physician Documentation:  I was present for or participated in the entire procedure, including opening and closing. No flowsheet data found. Lab Results   Component Value Date/Time    GLUCOSE 106 05/02/2022 06:58 AM       The ASCVD Risk score (Feng Davis., et al., 2013) failed to calculate for the following reasons:     The 2013 ASCVD risk score is only valid for ages 36 to 78    Immunization History   Administered Date(s) Administered  Influenza, MDCK Quadv, IM, PF (Flucelvax 2 yrs and older) 12/08/2021    Tdap (Boostrix, Adacel) 02/25/2020       Health Maintenance   Topic Date Due    Varicella vaccine (1 of 2 - 2-dose childhood series) Never done    COVID-19 Vaccine (1) Never done    HPV vaccine (1 - 2-dose series) Never done    Depression Screen  Never done    HIV screen  Never done    Flu vaccine (1) 09/01/2022    Chlamydia screen  11/09/2022    DTaP/Tdap/Td vaccine (2 - Td or Tdap) 02/25/2030    Hepatitis C screen  Completed    Hepatitis A vaccine  Aged Out    Hepatitis B vaccine  Aged Out    Hib vaccine  Aged Out    Meningococcal (ACWY) vaccine  Aged Out    Pneumococcal 0-64 years Vaccine  Aged Out       Assessment & Plan   Follow-up, postpartum, routine  Encounter for IUD insertion  -     44222 - RI INSERT INTRAUTERINE DEVICE    IUD inserted  F/u in 6 wks for strings     Return in about 6 weeks (around 8/17/2022), or if symptoms worsen or fail to improve, for IUD check up . Kenton Tao MD, personally performed services described in this document as scribed by Claus Perez RN in my presence, and it is both accurate and complete.

## 2022-07-14 ENCOUNTER — TELEPHONE (OUTPATIENT)
Dept: OBGYN CLINIC | Age: 22
End: 2022-07-14

## 2022-07-14 NOTE — TELEPHONE ENCOUNTER
Pt called office and states she had an IUD inserted on 7-6-22, and today when she removed her tampon, the IUD came out with it. She states she had not had any abnormal pain. She would like another one, I told her that Martha will get in touch with her tomorrow. She v/u. Walk in

## 2022-07-15 ENCOUNTER — OFFICE VISIT (OUTPATIENT)
Dept: OBGYN CLINIC | Age: 22
End: 2022-07-15
Payer: MEDICAID

## 2022-07-15 VITALS
SYSTOLIC BLOOD PRESSURE: 117 MMHG | DIASTOLIC BLOOD PRESSURE: 76 MMHG | WEIGHT: 155 LBS | HEART RATE: 78 BPM | HEIGHT: 65 IN | BODY MASS INDEX: 25.83 KG/M2

## 2022-07-15 DIAGNOSIS — Z30.430 ENCOUNTER FOR IUD INSERTION: Primary | ICD-10-CM

## 2022-07-15 PROCEDURE — 58300 INSERT INTRAUTERINE DEVICE: CPT | Performed by: OBSTETRICS & GYNECOLOGY

## 2022-07-15 RX ORDER — LEVONORGESTREL 52 MG/1
1 INTRAUTERINE DEVICE INTRAUTERINE ONCE
COMMUNITY

## 2022-07-15 NOTE — PROGRESS NOTES
Pt had Mirena placedon 7/6/2022 and removed it on accident yesterday when removing her tampon. Here for a new Mirena insertion     IUD Insertion Procedure Note    Pre-operative Diagnosis: cycle control     Post-operative Diagnosis: same    Indications: undesired fertility    Procedure Details    Urine pregnancy test was not done. The risks (including infection, bleeding, pain, and uterine perforation) and benefits of the procedure were explained to the patient and Written informed consent was obtained. Cervix cleansed with Betadine. Uterus sounded to 9 cm. IUD inserted without difficulty. String visible and trimmed. Patient tolerated procedure well. IUD Information:  Mirena. Condition:  Stable    Complications:  None    Plan:    The patient was advised to call for any fever or for prolonged or severe pain or bleeding. She was advised to use OTC ibuprofen as needed for mild to moderate pain. Attending Physician Documentation:  I was present for or participated in the entire procedure, including opening and closing. Homa Jeffery MD, personally performed services described in this document as scribed by Kevin Angela RN in my presence, and it is both accurate and complete.

## 2022-12-20 ENCOUNTER — TELEPHONE (OUTPATIENT)
Dept: OBGYN CLINIC | Age: 22
End: 2022-12-20

## 2022-12-20 NOTE — TELEPHONE ENCOUNTER
Ivory James with [facility] called asking if request for medical records was received and also clearance for pregnancy that Dr. Anisha Liu was needing to fill out. I told Katlyn Fox that we received the records request on 12/09/22, Katlyn Fox is resending the clearance form. If office could please send office records to fax# [phone], if needed contact Katlyn Fox @ [phone].     Thank you

## 2023-06-15 ENCOUNTER — TELEPHONE (OUTPATIENT)
Dept: OTOLARYNGOLOGY | Facility: CLINIC | Age: 23
End: 2023-06-15

## 2023-06-15 NOTE — TELEPHONE ENCOUNTER
Call placed to patient with no answer, message left requesting call back and informing of appointment on 8/1/2023 [id number], reminder mailed

## 2023-06-22 PROCEDURE — 96374 THER/PROPH/DIAG INJ IV PUSH: CPT

## 2023-06-22 PROCEDURE — 99284 EMERGENCY DEPT VISIT MOD MDM: CPT

## 2023-06-22 ASSESSMENT — PAIN DESCRIPTION - LOCATION: LOCATION: ABDOMEN

## 2023-06-22 ASSESSMENT — PAIN - FUNCTIONAL ASSESSMENT: PAIN_FUNCTIONAL_ASSESSMENT: 0-10

## 2023-06-22 ASSESSMENT — PAIN SCALES - GENERAL: PAINLEVEL_OUTOF10: 8

## 2023-06-23 ENCOUNTER — HOSPITAL ENCOUNTER (EMERGENCY)
Age: 23
Discharge: HOME | End: 2023-06-23
Attending: PEDIATRICS
Payer: MEDICAID

## 2023-06-23 VITALS
OXYGEN SATURATION: 96 % | TEMPERATURE: 98.1 F | WEIGHT: 160 LBS | HEART RATE: 78 BPM | RESPIRATION RATE: 18 BRPM | SYSTOLIC BLOOD PRESSURE: 105 MMHG | HEIGHT: 65 IN | BODY MASS INDEX: 26.66 KG/M2 | DIASTOLIC BLOOD PRESSURE: 74 MMHG

## 2023-06-23 DIAGNOSIS — O20.0 THREATENED MISCARRIAGE IN EARLY PREGNANCY: Primary | ICD-10-CM

## 2023-06-23 DIAGNOSIS — A59.01 VAGINAL TRICHOMONIASIS: ICD-10-CM

## 2023-06-23 DIAGNOSIS — N39.0 ACUTE URINARY TRACT INFECTION: ICD-10-CM

## 2023-06-23 DIAGNOSIS — O20.0 ABORTION, THREATENED: Primary | ICD-10-CM

## 2023-06-23 LAB
ALBUMIN SERPL-MCNC: 4 G/DL (ref 3.5–5.2)
ALP SERPL-CCNC: 71 U/L (ref 35–104)
ALT SERPL-CCNC: 26 U/L (ref 5–33)
ANION GAP SERPL CALCULATED.3IONS-SCNC: 8 MMOL/L (ref 7–19)
AST SERPL-CCNC: 23 U/L (ref 5–32)
BASOPHILS # BLD: 0 K/UL (ref 0–0.2)
BASOPHILS NFR BLD: 0.4 % (ref 0–1)
BILIRUB SERPL-MCNC: 0.3 MG/DL (ref 0.2–1.2)
BILIRUB UR QL STRIP: NEGATIVE
BUN SERPL-MCNC: 9 MG/DL (ref 6–20)
CALCIUM SERPL-MCNC: 8.6 MG/DL (ref 8.6–10)
CHLORIDE SERPL-SCNC: 106 MMOL/L (ref 98–111)
CLARITY UR: ABNORMAL
CO2 SERPL-SCNC: 25 MMOL/L (ref 22–29)
COLOR UR: ABNORMAL
CREAT SERPL-MCNC: 0.6 MG/DL (ref 0.5–0.9)
EOSINOPHIL # BLD: 0.1 K/UL (ref 0–0.6)
EOSINOPHIL NFR BLD: 1.6 % (ref 0–5)
ERYTHROCYTE [DISTWIDTH] IN BLOOD BY AUTOMATED COUNT: 13.3 % (ref 11.5–14.5)
GLUCOSE SERPL-MCNC: 89 MG/DL (ref 74–109)
GLUCOSE UR STRIP.AUTO-MCNC: NEGATIVE MG/DL
GONADOTROPIN, CHORIONIC (HCG) QUANT: 22.7 MIU/ML (ref 0–5.3)
HCT VFR BLD AUTO: 38.7 % (ref 37–47)
HGB BLD-MCNC: 12.8 G/DL (ref 12–16)
HGB UR STRIP.AUTO-MCNC: ABNORMAL MG/L
IMM GRANULOCYTES # BLD: 0 K/UL
KETONES UR STRIP.AUTO-MCNC: NEGATIVE MG/DL
LEUKOCYTE ESTERASE UR QL STRIP.AUTO: ABNORMAL
LYMPHOCYTES # BLD: 2.3 K/UL (ref 1.1–4.5)
LYMPHOCYTES NFR BLD: 34.7 % (ref 20–40)
MCH RBC QN AUTO: 28.6 PG (ref 27–31)
MCHC RBC AUTO-ENTMCNC: 33.1 G/DL (ref 33–37)
MCV RBC AUTO: 86.4 FL (ref 81–99)
MONOCYTES # BLD: 0.3 K/UL (ref 0–0.9)
MONOCYTES NFR BLD: 4.8 % (ref 0–10)
NEUTROPHILS # BLD: 3.9 K/UL (ref 1.5–7.5)
NEUTS SEG NFR BLD: 58.4 % (ref 50–65)
NITRITE UR QL STRIP.AUTO: NEGATIVE
PH UR STRIP.AUTO: 5 [PH] (ref 5–8)
PLATELET # BLD AUTO: 247 K/UL (ref 130–400)
PMV BLD AUTO: 10.4 FL (ref 9.4–12.3)
POTASSIUM SERPL-SCNC: 3.8 MMOL/L (ref 3.5–5)
PROT SERPL-MCNC: 6.8 G/DL (ref 6.6–8.7)
PROT UR STRIP.AUTO-MCNC: 300 MG/DL
RBC # BLD AUTO: 4.48 M/UL (ref 4.2–5.4)
RBC #/AREA URNS HPF: ABNORMAL /HPF (ref 0–2)
SODIUM SERPL-SCNC: 139 MMOL/L (ref 136–145)
SP GR UR STRIP.AUTO: 1.02 (ref 1–1.03)
SQUAMOUS #/AREA URNS HPF: ABNORMAL /HPF
TRICHOMONAS #/AREA URNS HPF: PRESENT /HPF
UROBILINOGEN UR STRIP.AUTO-MCNC: 1 E.U./DL
WBC # BLD AUTO: 6.7 K/UL (ref 4.8–10.8)
WBC #/AREA URNS HPF: ABNORMAL /HPF (ref 0–5)

## 2023-06-23 PROCEDURE — 6360000002 HC RX W HCPCS: Performed by: PEDIATRICS

## 2023-06-23 PROCEDURE — 6370000000 HC RX 637 (ALT 250 FOR IP): Performed by: PEDIATRICS

## 2023-06-23 PROCEDURE — 84702 CHORIONIC GONADOTROPIN TEST: CPT

## 2023-06-23 PROCEDURE — 36415 COLL VENOUS BLD VENIPUNCTURE: CPT

## 2023-06-23 PROCEDURE — 87086 URINE CULTURE/COLONY COUNT: CPT

## 2023-06-23 PROCEDURE — 85025 COMPLETE CBC W/AUTO DIFF WBC: CPT

## 2023-06-23 PROCEDURE — 2580000003 HC RX 258: Performed by: PEDIATRICS

## 2023-06-23 PROCEDURE — 81001 URINALYSIS AUTO W/SCOPE: CPT

## 2023-06-23 PROCEDURE — 80053 COMPREHEN METABOLIC PANEL: CPT

## 2023-06-23 RX ORDER — 0.9 % SODIUM CHLORIDE 0.9 %
1000 INTRAVENOUS SOLUTION INTRAVENOUS ONCE
Status: DISCONTINUED | OUTPATIENT
Start: 2023-06-23 | End: 2023-06-23 | Stop reason: HOSPADM

## 2023-06-23 RX ORDER — METRONIDAZOLE 500 MG/1
2000 TABLET ORAL ONCE
Status: COMPLETED | OUTPATIENT
Start: 2023-06-23 | End: 2023-06-23

## 2023-06-23 RX ORDER — CEPHALEXIN 500 MG/1
500 CAPSULE ORAL 2 TIMES DAILY
Qty: 14 CAPSULE | Refills: 0 | Status: SHIPPED | OUTPATIENT
Start: 2023-06-23 | End: 2023-06-30

## 2023-06-23 RX ADMIN — METRONIDAZOLE 2000 MG: 500 TABLET ORAL at 02:39

## 2023-06-23 RX ADMIN — WATER 1000 MG: 1 INJECTION INTRAMUSCULAR; INTRAVENOUS; SUBCUTANEOUS at 02:40

## 2023-06-23 ASSESSMENT — ENCOUNTER SYMPTOMS
SHORTNESS OF BREATH: 0
RHINORRHEA: 0
NAUSEA: 0
BACK PAIN: 0
ABDOMINAL PAIN: 0
VOMITING: 0
COUGH: 0
COLOR CHANGE: 0
EYE DISCHARGE: 0

## 2023-06-23 NOTE — DISCHARGE INSTRUCTIONS
Return with heavy bleeding, increasing or severe pain, persistent vomiting, or other concerns. Drink at least 60 to 80 ounces of water daily.

## 2023-06-23 NOTE — ED PROVIDER NOTES
[facility]  eMERGENCY dEPARTMENT eNCOUnter      Pt Name: Kayleigh Angeles  MRN: [id number]  [place] 2000  Date of evaluation: 6/22/2023  Provider: Capri Marshall MD    CHIEF COMPLAINT       Chief Complaint   Patient presents with    Vaginal Bleeding     About 4 weeks pregnant, left side abdominal cramping         HISTORY OF PRESENT ILLNESS   (Location/Symptom, Timing/Onset,Context/Setting, Quality, Duration, Modifying Factors, Severity)  Note limiting factors. Kayleigh Angeles is a 25 y.o. female who presents to the emergency department with vaginal bleeding. Patient is a G4, P3 at approximately 4 weeks gestation. Last normal menstrual period 5/20/2023. Patient has appointment on July 19, 2023 with her OB/GYN. Patient states that she began vaginal bleeding yesterday morning. Patient states \"it was light then around 1:30 PM it became heavier than my period. \"  Patient became concerned at that time and more concerned when she had difficulty walking. Patient states I had left side nearby hip pain. Pain was intermittent and felt like an ache. Pain occasionally radiated to the right side. Moving worsens pain and sitting eases. Patient denies vaginal discharge, vaginal pain, back pain, burning with urination or difficulty urinating. HPI    NursingNotes were reviewed. REVIEW OF SYSTEMS    (2-9 systems for level 4, 10 or more for level 5)     Review of Systems   Constitutional:  Negative for chills and fever. HENT:  Negative for congestion and rhinorrhea. Eyes:  Negative for discharge. Respiratory:  Negative for cough and shortness of breath. Cardiovascular:  Negative for chest pain and palpitations. Gastrointestinal:  Negative for abdominal pain, nausea and vomiting. Genitourinary:  Positive for vaginal bleeding. Negative for difficulty urinating, dysuria and vaginal discharge. Musculoskeletal:  Negative for back pain and neck pain.    Skin:  Negative for color change and

## 2023-06-25 LAB — BACTERIA UR CULT: NORMAL

## 2023-06-26 DIAGNOSIS — O02.1 MISSED AB: Primary | ICD-10-CM

## 2023-08-01 ENCOUNTER — OFFICE VISIT (OUTPATIENT)
Dept: OTOLARYNGOLOGY | Facility: CLINIC | Age: 23
End: 2023-08-01
Payer: MEDICAID

## 2023-08-01 VITALS — WEIGHT: 169 LBS | HEIGHT: 64 IN | BODY MASS INDEX: 28.85 KG/M2 | TEMPERATURE: 98.2 F

## 2023-08-01 DIAGNOSIS — J03.91 RECURRENT TONSILLITIS: ICD-10-CM

## 2023-08-01 DIAGNOSIS — G47.30 SLEEP DISORDER BREATHING: ICD-10-CM

## 2023-08-01 DIAGNOSIS — R06.83 SNORING: ICD-10-CM

## 2023-08-01 DIAGNOSIS — J35.01 CHRONIC TONSILLITIS: Primary | ICD-10-CM

## 2023-08-01 NOTE — H&P (VIEW-ONLY)
LANDY العراقي  [facility]  [address], [address]  [place] [facility] [address]  Fax [phone]  Phone [phone]      Visit Type: NEW PATIENT   Chief Complaint   Patient presents with    Sore Throat     Tonsillitis    Snoring        HPI  Ira Valenzuela is a 23 y.o. female presets for evaluation of frequent tonsillitis, large tonsils, snoring, and apnec pauses at night. The symptoms are located at the throat.. Average number of tonsillitis episodes per year : 4-5. Number of years tonsil infections have been present : 8-9. She has had snoring. She has had episodes of apnea. She has had lymphadenopathy. Aggravating factors: no identifiable factors. Alleviating factors: no identifiable factors.    History reviewed. No pertinent past medical history.    Past Surgical History:   Procedure Laterality Date     SECTION         Family History: Her family history is not on file.     Social History: She  reports that she has never smoked. She has never used smokeless tobacco. She reports that she does not drink alcohol and does not use drugs.    Home Medications:       Allergies:  She has No Known Allergies.       Vital Signs:   Temp:  [98.2 °F (36.8 °C)] 98.2 °F (36.8 °C)  ENT Physical Exam  Constitutional  Appearance: patient appears well-developed, well-nourished and well-groomed,  Communication/Voice: communication appropriate for developmental age; vocal quality normal;  Head and Face  Appearance: head appears normal, face appears normal and face appears atraumatic;  Palpation: facial palpation normal;  Salivary: glands normal;  Ear  Hearing: intact;  Auricles: right auricle normal; left auricle normal;  External Mastoids: right external mastoid normal; left external mastoid normal;  Ear Canals: right ear canal normal; left ear canal normal;  Tympanic Membranes: right tympanic membrane normal; left tympanic membrane  normal;  Nose  External Nose: nares patent bilaterally; external nose normal;  Internal Nose: nasal mucosa normal; septum normal; bilateral inferior turbinates normal;  Oral Cavity/Oropharynx  Tonsils: bilateral tonsils 2+, cryptic;  Neck  Neck: neck normal; neck palpation normal;  Respiratory  Inspection: breathing unlabored; normal breathing rate;  Cardiovascular  Inspection: extremities are warm and well perfused;  Lymphatic  Palpation: shotty cervical adenopathy noted;       Result Review    RESULTS REVIEW    I have reviewed the patients old records in the chart.     Assessment & Plan    Diagnoses and all orders for this visit:    1. Chronic tonsillitis (Primary)  -     Case Request; Standing  -     dexamethasone (DECADRON) 10 mg in sodium chloride 0.9 % IVPB  -     Case Request    2. Recurrent tonsillitis  -     Case Request; Standing  -     dexamethasone (DECADRON) 10 mg in sodium chloride 0.9 % IVPB  -     Case Request    3. Snoring  -     Case Request; Standing  -     dexamethasone (DECADRON) 10 mg in sodium chloride 0.9 % IVPB  -     Case Request    4. Sleep disorder breathing  -     Case Request; Standing  -     dexamethasone (DECADRON) 10 mg in sodium chloride 0.9 % IVPB  -     Case Request    Other orders  -     Follow Anesthesia Guidelines / Protocol; Future  -     Provide Patient With Instructions on NPO Status  -     Follow Anesthesia Guidelines / Protocol; Standing  -     Verify NPO Status; Standing  -     Obtain Informed Consent; Standing  -     Patient to Void Prior to Transfer to OR; Standing  -     Instructions for Nursing; Standing       Medical and surgical options were discussed including medical and surgical options. Risks, benefits and alternatives were discussed and questions were answered. After considering the options, the patient decided to proceed with surgery.     -----SURGERY SCHEDULING:-----  Schedule tonsillectomy and adenoidectomy with coblation (Bilateral)    ---INFORMED CONSENT  DISCUSSION:---  TONSILLECTOMY AND ADENOIDECTOMY: A tonsillectomy and adenoidectomy were recommended. The risks and benefits were explained including but not limited to early and late bleeding, infection, risks of the general anesthesia, dysphagia and poor PO intake, and voice change/VPI.  Alternatives were discussed. The patient/parents understood these risks and wanted to proceed. Questions were asked appropriately answered.      ---PREOPERATIVE WORKUP:---  labs/ workup per anesthesia    Return for Post Operatively.      Noni Ca, APRN   08/01/23  10:29 CDT

## 2023-08-10 ENCOUNTER — PRE-ADMISSION TESTING (OUTPATIENT)
Dept: PREADMISSION TESTING | Facility: HOSPITAL | Age: 23
End: 2023-08-10
Payer: MEDICAID

## 2023-08-10 VITALS
HEART RATE: 83 BPM | RESPIRATION RATE: 16 BRPM | WEIGHT: 170.19 LBS | DIASTOLIC BLOOD PRESSURE: 62 MMHG | HEIGHT: 64 IN | OXYGEN SATURATION: 99 % | SYSTOLIC BLOOD PRESSURE: 114 MMHG | BODY MASS INDEX: 29.06 KG/M2

## 2023-08-10 LAB
DEPRECATED RDW RBC AUTO: 39.8 FL (ref 37–54)
ERYTHROCYTE [DISTWIDTH] IN BLOOD BY AUTOMATED COUNT: 12.6 % (ref 12.3–15.4)
HCT VFR BLD AUTO: 39.8 % (ref 34–46.6)
HGB BLD-MCNC: 13 G/DL (ref 12–15.9)
MCH RBC QN AUTO: 28.1 PG (ref 26.6–33)
MCHC RBC AUTO-ENTMCNC: 32.7 G/DL (ref 31.5–35.7)
MCV RBC AUTO: 86 FL (ref 79–97)
PLATELET # BLD AUTO: 279 10*3/MM3 (ref 140–450)
PMV BLD AUTO: 11.2 FL (ref 6–12)
RBC # BLD AUTO: 4.63 10*6/MM3 (ref 3.77–5.28)
WBC NRBC COR # BLD: 6.84 10*3/MM3 (ref 3.4–10.8)

## 2023-08-10 PROCEDURE — 36415 COLL VENOUS BLD VENIPUNCTURE: CPT

## 2023-08-10 PROCEDURE — 85027 COMPLETE CBC AUTOMATED: CPT

## 2023-08-10 NOTE — DISCHARGE INSTRUCTIONS
Before you come to the hospital        Arrival time: AS DIRECTED BY OFFICE     YOU MAY TAKE THE FOLLOWING MEDICATION(S) THE MORNING OF SURGERY WITH A SIP OF WATER: NONE           ALL OTHER HOME MEDICATION CHECK WITH YOUR PHYSICIAN (especially if   you are taking diabetes medicines or blood thinners)            Eating and drinking restrictions prior to scheduled arrival time    2 Hours before arrival time STOP   Drinking Clear liquids (water, black coffee-NO CREAM,  apple juice-no pulp)      8 Hours before arrival time STOP   All food, full liquids, and dairy products    (It is extremely important that you follow these guidelines to prevent delay or cancelation of your procedure)     Clear Liquids  Water and flavored water                                                                      Clear Fruit juices, such as cranberry juice and apple juice.  Black coffee (NO cream of any kind, including powdered).  Plain tea  Clear bouillon or broth.  Flavored gelatin.  Soda.  Gatorade or Powerade.  Full liquid examples  Juices that have pulp.  Frozen ice pops that contain fruit pieces.  Coffee with creamer  Milk.  Yogurt.                MANAGING PAIN AFTER SURGERY    We know you are probably wondering what your pain will be like after surgery.  Following surgery it is unrealistic to expect you will not have pain.   Pain is how our bodies let us know that something is wrong or cautions us to be careful.  That said, our goal is to make your pain tolerable.    Methods we may use to treat your pain include (oral or IV medications, PCAs, epidurals, nerve blocks, etc.)   While some procedures require IV pain medications for a short time after surgery, transitioning to pain medications by mouth allows for better management of pain.   Your nurse will encourage you to take oral pain medications whenever possible.  IV medications work almost immediately, but only last a short while.  Taking medications by mouth allows for a more  constant level of medication in your blood stream for a longer period of time.      Once your pain is out of control it is harder to get back under control.  It is important you are aware when your next dose of pain medication is due.  If you are admitted, your nurse may write the time of your next dose on the white board in your room to help you remember.      We are interested in your pain and encourage you to inform us about aggravating factors during your visit.   Many times a simple repositioning every few hours can make a big difference.    If your physician says it is okay, do not let your pain prevent you from getting out of bed. Be sure to call your nurse for assistance prior to getting up so you do not fall.      Before surgery, please decide your tolerable pain goal.  These faces help describe the pain ratings we use on a 0-10 scale.   Be prepared to tell us your goal and whether or not you take pain or anxiety medications at home.          Preparing for Surgery  Preparing for surgery is an important part of your care. It can make things go more smoothly and help you avoid complications. The steps leading up to surgery may vary among hospitals. Follow all instructions given to you by your health care providers. Ask questions if you do not understand something. Talk about any concerns that you have.  Here are some questions to consider asking before your surgery:  If my surgery is not an emergency (is elective), when would be the best time to have the surgery?  What arrangements do I need to make for work, home, or school?  What will my recovery be like? How long will it be before I can return to normal activities?  Will I need to prepare my home? Will I need to arrange care for me or my children?  Should I expect to have pain after surgery? What are my pain management options? Are there nonmedical options that I can try for pain?  Tell a health care provider about:  Any allergies you have.  All medicines  you are taking, including vitamins, herbs, eye drops, creams, and over-the-counter medicines.  Any problems you or family members have had with anesthetic medicines.  Any blood disorders you have.  Any surgeries you have had.  Any medical conditions you have.  Whether you are pregnant or may be pregnant.  What are the risks?  The risks and complications of surgery depend on the specific procedure that you have. Discuss all the risks with your health care providers before your surgery. Ask about common surgical complications, which may include:  Infection.  Bleeding or a need for blood replacement (transfusion).  Allergic reactions to medicines.  Damage to surrounding nerves, tissues, or structures.  A blood clot.  Scarring.  Failure of the surgery to correct the problem.  Follow these instructions before the procedure:  Several days or weeks before your procedure  You may have a physical exam by your primary health care provider to make sure it is safe for you to have surgery.  You may have testing. This may include a chest X-ray, blood and urine tests, electrocardiogram (ECG), or other testing.  Ask your health care provider about:  Changing or stopping your regular medicines. This is especially important if you are taking diabetes medicines or blood thinners.  Taking medicines such as aspirin and ibuprofen. These medicines can thin your blood. Do not take these medicines unless your health care provider tells you to take them.  Taking over-the-counter medicines, vitamins, herbs, and supplements.  Do not use any products that contain nicotine or tobacco, such as cigarettes and e-cigarettes. If you need help quitting, ask your health care provider.  Avoid alcohol.  Ask your health care provider if there are exercises you can do to prepare for surgery.  Eat a healthy diet.   Plan to have someone 18 years of age or older to take you home from the hospital. We will need to verify your ride on the morning of surgery if  you are being discharged home on the same day. Tell your ride to be expecting a call from the hospital prior to your procedure.   Plan to have a responsible adult care for you for at least 24 hours after you leave the hospital or clinic. This is important.  The day before your procedure  You may be given antibiotic medicine to take by mouth to help prevent infection. Take it as told by your health care provider.  You may be asked to shower with a germ-killing soap.  Follow instructions from your health care provider about eating and drinking restrictions. This includes gum, mints and hard candy.  Pack comfortable clothes according to your procedure.   The day of your procedure  You may need to take another shower with a germ-killing soap before you leave home in the morning.  With a small sip of water, take only the medicines that you are told to take.  Remove all jewelry including rings.   Leave anything you consider valuable at home except hearing aids if needed.  You do not need to bring your home medications into the hospital.   Do not wear any makeup, nail polish, powder, deodorant, lotion, hair accessories, or anything on your skin or body except your clothes.  If you will be staying in the hospital, bring a case to hold your glasses, contacts, or dentures. You may also want to bring your robe and non-skid footwear.       (Do not use denture adhesives since you will be asked to remove them during  surgery).   If you wear oxygen at home, bring it with you the day of surgery.  If instructed by your health care provider, bring your sleep apnea device with you on the day of your surgery (if this applies to you).  You may want to leave your suitcase and sleep apnea device in the car until after surgery.   Arrive at the hospital as scheduled.  Bring a friend or family member with you who can help to answer questions and be present while you meet with your health care provider.  At the hospital  When you arrive at  the hospital:  Go to registration located at the main entrance of the hospital. You will be registered and given a beeper and a sticker sheet. Take the stickers to the Outpatient nurses desk and place in the black tray. This is to notify staff that you have arrived. Then return to the lobby to wait.   When your beeper lights up and vibrates proceed through the double doors, under the stairs, and a member of the Outpatient Surgery staff will escort you to your preoperative room.  You may have to wear compression sleeves. These help to prevent blood clots and reduce swelling in your legs.  An IV may be inserted into one of your veins.              In the operating room, you may be given one or more of the following:        A medicine to help you relax (sedative).        A medicine to numb the area (local anesthetic).        A medicine to make you fall asleep (general anesthetic).        A medicine that is injected into an area of your body to numb everything below the                      injection site (regional anesthetic).  You may be given an antibiotic through your IV to help prevent infection.  Your surgical site will be marked or identified.    Contact a health care provider if you:  Develop a fever of more than 100.4øF (38øC) or other feelings of illness during the 48 hours before your surgery.  Have symptoms that get worse.  Have questions or concerns about your surgery.  Summary  Preparing for surgery can make the procedure go more smoothly and lower your risk of complications.  Before surgery, make a list of questions and concerns to discuss with your surgeon. Ask about the risks and possible complications.  In the days or weeks before your surgery, follow all instructions from your health care provider. You may need to stop smoking, avoid alcohol, follow eating restrictions, and change or stop your regular medicines.  Contact your surgeon if you develop a fever or other signs of illness during the few days  before your surgery.  This information is not intended to replace advice given to you by your health care provider. Make sure you discuss any questions you have with your health care provider.  Document Revised: 12/21/2018 Document Reviewed: 10/23/2018  Elsevier Patient Education c 2021 [employer]

## 2023-08-17 ENCOUNTER — HOSPITAL ENCOUNTER (OUTPATIENT)
Facility: HOSPITAL | Age: 23
Setting detail: HOSPITAL OUTPATIENT SURGERY
Discharge: HOME | End: 2023-08-17
Attending: OTOLARYNGOLOGY | Admitting: OTOLARYNGOLOGY
Payer: MEDICAID

## 2023-08-17 ENCOUNTER — ANESTHESIA (OUTPATIENT)
Dept: PERIOP | Facility: HOSPITAL | Age: 23
End: 2023-08-17
Payer: MEDICAID

## 2023-08-17 ENCOUNTER — ANESTHESIA EVENT (OUTPATIENT)
Dept: PERIOP | Facility: HOSPITAL | Age: 23
End: 2023-08-17
Payer: MEDICAID

## 2023-08-17 VITALS
HEART RATE: 72 BPM | OXYGEN SATURATION: 100 % | TEMPERATURE: 97.6 F | RESPIRATION RATE: 18 BRPM | DIASTOLIC BLOOD PRESSURE: 56 MMHG | SYSTOLIC BLOOD PRESSURE: 94 MMHG

## 2023-08-17 DIAGNOSIS — R06.83 SNORING: ICD-10-CM

## 2023-08-17 DIAGNOSIS — J35.01 CHRONIC TONSILLITIS: ICD-10-CM

## 2023-08-17 DIAGNOSIS — Z90.89 S/P TONSILLECTOMY AND ADENOIDECTOMY: Primary | ICD-10-CM

## 2023-08-17 DIAGNOSIS — J03.91 RECURRENT TONSILLITIS: ICD-10-CM

## 2023-08-17 DIAGNOSIS — G47.30 SLEEP DISORDER BREATHING: ICD-10-CM

## 2023-08-17 LAB — B-HCG UR QL: NEGATIVE

## 2023-08-17 PROCEDURE — 42821 REMOVE TONSILS AND ADENOIDS: CPT | Performed by: OTOLARYNGOLOGY

## 2023-08-17 PROCEDURE — 25010000002 PROPOFOL 10 MG/ML EMULSION

## 2023-08-17 PROCEDURE — 25010000002 DEXAMETHASONE PER 1 MG

## 2023-08-17 PROCEDURE — 88304 TISSUE EXAM BY PATHOLOGIST: CPT | Performed by: OTOLARYNGOLOGY

## 2023-08-17 PROCEDURE — 25010000002 FENTANYL CITRATE (PF) 100 MCG/2ML SOLUTION

## 2023-08-17 PROCEDURE — 25010000002 DEXAMETHASONE PER 1 MG: Performed by: EMERGENCY MEDICINE

## 2023-08-17 PROCEDURE — 81025 URINE PREGNANCY TEST: CPT | Performed by: OTOLARYNGOLOGY

## 2023-08-17 PROCEDURE — 25010000002 ONDANSETRON PER 1 MG

## 2023-08-17 RX ORDER — SUCCINYLCHOLINE/SOD CL,ISO/PF 200MG/10ML
SYRINGE (ML) INTRAVENOUS AS NEEDED
Status: DISCONTINUED | OUTPATIENT
Start: 2023-08-17 | End: 2023-08-17 | Stop reason: SURG

## 2023-08-17 RX ORDER — SODIUM CHLORIDE 0.9 % (FLUSH) 0.9 %
3-10 SYRINGE (ML) INJECTION AS NEEDED
Status: DISCONTINUED | OUTPATIENT
Start: 2023-08-17 | End: 2023-08-17 | Stop reason: HOSPADM

## 2023-08-17 RX ORDER — PROPOFOL 10 MG/ML
VIAL (ML) INTRAVENOUS AS NEEDED
Status: DISCONTINUED | OUTPATIENT
Start: 2023-08-17 | End: 2023-08-17 | Stop reason: SURG

## 2023-08-17 RX ORDER — PREDNISONE 5 MG/ML
10 SOLUTION ORAL ONCE
Qty: 20 ML | Refills: 0 | Status: SHIPPED | OUTPATIENT
Start: 2023-08-20 | End: 2023-08-20

## 2023-08-17 RX ORDER — DEXAMETHASONE SODIUM PHOSPHATE 4 MG/ML
INJECTION, SOLUTION INTRA-ARTICULAR; INTRALESIONAL; INTRAMUSCULAR; INTRAVENOUS; SOFT TISSUE AS NEEDED
Status: DISCONTINUED | OUTPATIENT
Start: 2023-08-17 | End: 2023-08-17 | Stop reason: SURG

## 2023-08-17 RX ORDER — IBUPROFEN 600 MG/1
600 TABLET ORAL ONCE AS NEEDED
Status: DISCONTINUED | OUTPATIENT
Start: 2023-08-17 | End: 2023-08-17

## 2023-08-17 RX ORDER — LIDOCAINE HYDROCHLORIDE 10 MG/ML
0.5 INJECTION, SOLUTION EPIDURAL; INFILTRATION; INTRACAUDAL; PERINEURAL ONCE AS NEEDED
Status: DISCONTINUED | OUTPATIENT
Start: 2023-08-17 | End: 2023-08-17 | Stop reason: HOSPADM

## 2023-08-17 RX ORDER — DROPERIDOL 2.5 MG/ML
0.62 INJECTION, SOLUTION INTRAMUSCULAR; INTRAVENOUS ONCE AS NEEDED
Status: DISCONTINUED | OUTPATIENT
Start: 2023-08-17 | End: 2023-08-17 | Stop reason: HOSPADM

## 2023-08-17 RX ORDER — HYDROMORPHONE HYDROCHLORIDE 1 MG/ML
0.5 INJECTION, SOLUTION INTRAMUSCULAR; INTRAVENOUS; SUBCUTANEOUS
Status: DISCONTINUED | OUTPATIENT
Start: 2023-08-17 | End: 2023-08-17 | Stop reason: HOSPADM

## 2023-08-17 RX ORDER — ACETAMINOPHEN 500 MG
1000 TABLET ORAL ONCE
Status: COMPLETED | OUTPATIENT
Start: 2023-08-17 | End: 2023-08-17

## 2023-08-17 RX ORDER — ONDANSETRON 2 MG/ML
INJECTION INTRAMUSCULAR; INTRAVENOUS AS NEEDED
Status: DISCONTINUED | OUTPATIENT
Start: 2023-08-17 | End: 2023-08-17 | Stop reason: SURG

## 2023-08-17 RX ORDER — OXYCODONE HCL 5 MG/5 ML
5 SOLUTION, ORAL ORAL EVERY 4 HOURS PRN
Status: DISCONTINUED | OUTPATIENT
Start: 2023-08-17 | End: 2023-08-17 | Stop reason: HOSPADM

## 2023-08-17 RX ORDER — SODIUM CHLORIDE 0.9 % (FLUSH) 0.9 %
3 SYRINGE (ML) INJECTION EVERY 12 HOURS SCHEDULED
Status: DISCONTINUED | OUTPATIENT
Start: 2023-08-17 | End: 2023-08-17 | Stop reason: HOSPADM

## 2023-08-17 RX ORDER — LABETALOL HYDROCHLORIDE 5 MG/ML
5 INJECTION, SOLUTION INTRAVENOUS
Status: DISCONTINUED | OUTPATIENT
Start: 2023-08-17 | End: 2023-08-17 | Stop reason: HOSPADM

## 2023-08-17 RX ORDER — ROCURONIUM BROMIDE 10 MG/ML
INJECTION, SOLUTION INTRAVENOUS AS NEEDED
Status: DISCONTINUED | OUTPATIENT
Start: 2023-08-17 | End: 2023-08-17 | Stop reason: SURG

## 2023-08-17 RX ORDER — HYDROMORPHONE HYDROCHLORIDE 1 MG/ML
0.2 INJECTION, SOLUTION INTRAMUSCULAR; INTRAVENOUS; SUBCUTANEOUS
Status: DISCONTINUED | OUTPATIENT
Start: 2023-08-17 | End: 2023-08-17 | Stop reason: HOSPADM

## 2023-08-17 RX ORDER — MIDAZOLAM HYDROCHLORIDE 1 MG/ML
1 INJECTION INTRAMUSCULAR; INTRAVENOUS
Status: DISCONTINUED | OUTPATIENT
Start: 2023-08-17 | End: 2023-08-17 | Stop reason: HOSPADM

## 2023-08-17 RX ORDER — SODIUM CHLORIDE 0.9 % (FLUSH) 0.9 %
3 SYRINGE (ML) INJECTION AS NEEDED
Status: DISCONTINUED | OUTPATIENT
Start: 2023-08-17 | End: 2023-08-17 | Stop reason: HOSPADM

## 2023-08-17 RX ORDER — SODIUM CHLORIDE, SODIUM LACTATE, POTASSIUM CHLORIDE, CALCIUM CHLORIDE 600; 310; 30; 20 MG/100ML; MG/100ML; MG/100ML; MG/100ML
1000 INJECTION, SOLUTION INTRAVENOUS CONTINUOUS
Status: DISCONTINUED | OUTPATIENT
Start: 2023-08-17 | End: 2023-08-17 | Stop reason: HOSPADM

## 2023-08-17 RX ORDER — FENTANYL CITRATE 50 UG/ML
INJECTION, SOLUTION INTRAMUSCULAR; INTRAVENOUS AS NEEDED
Status: DISCONTINUED | OUTPATIENT
Start: 2023-08-17 | End: 2023-08-17 | Stop reason: SURG

## 2023-08-17 RX ORDER — DEXTROSE MONOHYDRATE, SODIUM CHLORIDE, AND POTASSIUM CHLORIDE 50; 1.49; 4.5 G/1000ML; G/1000ML; G/1000ML
125 INJECTION, SOLUTION INTRAVENOUS CONTINUOUS
Status: DISCONTINUED | OUTPATIENT
Start: 2023-08-17 | End: 2023-08-17 | Stop reason: HOSPADM

## 2023-08-17 RX ORDER — OXYCODONE HCL 5 MG/5 ML
5 SOLUTION, ORAL ORAL EVERY 4 HOURS PRN
Qty: 90 ML | Refills: 0 | Status: SHIPPED | OUTPATIENT
Start: 2023-08-17

## 2023-08-17 RX ORDER — DEXAMETHASONE SODIUM PHOSPHATE 10 MG/ML
10 INJECTION INTRAMUSCULAR; INTRAVENOUS
Status: DISCONTINUED | OUTPATIENT
Start: 2023-08-17 | End: 2023-08-17 | Stop reason: HOSPADM

## 2023-08-17 RX ORDER — SODIUM CHLORIDE 9 MG/ML
40 INJECTION, SOLUTION INTRAVENOUS AS NEEDED
Status: DISCONTINUED | OUTPATIENT
Start: 2023-08-17 | End: 2023-08-17 | Stop reason: HOSPADM

## 2023-08-17 RX ORDER — NALOXONE HCL 0.4 MG/ML
0.04 VIAL (ML) INJECTION AS NEEDED
Status: DISCONTINUED | OUTPATIENT
Start: 2023-08-17 | End: 2023-08-17 | Stop reason: HOSPADM

## 2023-08-17 RX ORDER — FLUMAZENIL 0.1 MG/ML
0.2 INJECTION INTRAVENOUS AS NEEDED
Status: DISCONTINUED | OUTPATIENT
Start: 2023-08-17 | End: 2023-08-17 | Stop reason: HOSPADM

## 2023-08-17 RX ORDER — BUPIVACAINE HCL/0.9 % NACL/PF 0.125 %
PLASTIC BAG, INJECTION (ML) EPIDURAL AS NEEDED
Status: DISCONTINUED | OUTPATIENT
Start: 2023-08-17 | End: 2023-08-17 | Stop reason: SURG

## 2023-08-17 RX ORDER — SODIUM CHLORIDE, SODIUM LACTATE, POTASSIUM CHLORIDE, CALCIUM CHLORIDE 600; 310; 30; 20 MG/100ML; MG/100ML; MG/100ML; MG/100ML
100 INJECTION, SOLUTION INTRAVENOUS CONTINUOUS
Status: DISCONTINUED | OUTPATIENT
Start: 2023-08-17 | End: 2023-08-17 | Stop reason: HOSPADM

## 2023-08-17 RX ORDER — ONDANSETRON 2 MG/ML
4 INJECTION INTRAMUSCULAR; INTRAVENOUS
Status: DISCONTINUED | OUTPATIENT
Start: 2023-08-17 | End: 2023-08-17 | Stop reason: HOSPADM

## 2023-08-17 RX ORDER — SODIUM CHLORIDE 9 MG/ML
INJECTION, SOLUTION INTRAVENOUS CONTINUOUS PRN
Status: COMPLETED | OUTPATIENT
Start: 2023-08-17 | End: 2023-08-17

## 2023-08-17 RX ORDER — FENTANYL CITRATE 50 UG/ML
25 INJECTION, SOLUTION INTRAMUSCULAR; INTRAVENOUS
Status: DISCONTINUED | OUTPATIENT
Start: 2023-08-17 | End: 2023-08-17 | Stop reason: HOSPADM

## 2023-08-17 RX ADMIN — PROPOFOL INJECTABLE EMULSION 200 MG: 10 INJECTION, EMULSION INTRAVENOUS at 08:00

## 2023-08-17 RX ADMIN — PROPOFOL INJECTABLE EMULSION 50 MG: 10 INJECTION, EMULSION INTRAVENOUS at 08:16

## 2023-08-17 RX ADMIN — DEXAMETHASONE SODIUM PHOSPHATE 10 MG: 10 INJECTION INTRAMUSCULAR; INTRAVENOUS at 07:17

## 2023-08-17 RX ADMIN — DEXAMETHASONE SODIUM PHOSPHATE 8 MG: 4 INJECTION, SOLUTION INTRA-ARTICULAR; INTRALESIONAL; INTRAMUSCULAR; INTRAVENOUS; SOFT TISSUE at 08:05

## 2023-08-17 RX ADMIN — ONDANSETRON 4 MG: 2 INJECTION INTRAMUSCULAR; INTRAVENOUS at 08:21

## 2023-08-17 RX ADMIN — FENTANYL CITRATE 100 MCG: 50 INJECTION, SOLUTION INTRAMUSCULAR; INTRAVENOUS at 08:00

## 2023-08-17 RX ADMIN — ACETAMINOPHEN 1000 MG: 500 TABLET, FILM COATED ORAL at 07:30

## 2023-08-17 RX ADMIN — SODIUM CHLORIDE, POTASSIUM CHLORIDE, SODIUM LACTATE AND CALCIUM CHLORIDE 1000 ML: 600; 310; 30; 20 INJECTION, SOLUTION INTRAVENOUS at 07:13

## 2023-08-17 RX ADMIN — ROCURONIUM BROMIDE 10 MG: 10 SOLUTION INTRAVENOUS at 08:00

## 2023-08-17 RX ADMIN — Medication 100 MCG: at 08:05

## 2023-08-17 RX ADMIN — Medication 160 MG: at 08:01

## 2023-08-17 NOTE — ANESTHESIA POSTPROCEDURE EVALUATION
Patient: Ira Valenzuela    Procedure Summary       Date: 08/17/23 Room / Location:  [facility] OR  /  [facility]    Anesthesia Start: 0756 Anesthesia Stop: 0843    Procedure: tonsillectomy and adenoidectomy with coblation (Bilateral: Throat) Diagnosis:       Chronic tonsillitis      Recurrent tonsillitis      Snoring      Sleep disorder breathing      (Chronic tonsillitis [J35.01])      (Recurrent tonsillitis [J03.91])      (Snoring [R06.83])      (Sleep disorder breathing [G47.30])    Surgeons: Cesar Parkinson MD Provider: Agustin Ozuna CRNA    Anesthesia Type: general ASA Status: 1            Anesthesia Type: general    Vitals  Vitals Value Taken Time   BP 96/48 08/17/23 0910   Temp 97.6 øF (36.4 øC) 08/17/23 0900   Pulse 72 08/17/23 0910   Resp 18 08/17/23 0910   SpO2 100 % 08/17/23 0910           Post Anesthesia Care and Evaluation    Patient location during evaluation: PACU  Patient participation: complete - patient participated  Level of consciousness: awake and alert  Pain management: adequate    Airway patency: patent  Anesthetic complications: No anesthetic complications    Cardiovascular status: acceptable  Respiratory status: acceptable  Hydration status: acceptable    Comments: Blood pressure 94/56, pulse 72, temperature 97.6 øF (36.4 øC), temperature source Temporal, resp. rate 18, SpO2 100 %, not currently breastfeeding.    Pt discharged from PACU based on randa score >8

## 2023-08-17 NOTE — ANESTHESIA PREPROCEDURE EVALUATION
Anesthesia Evaluation     Patient summary reviewed and Nursing notes reviewed   no history of anesthetic complications:   NPO Solid Status: > 8 hours  NPO Liquid Status: > 8 hours           Airway   Mallampati: I  No difficulty expected  Dental      Pulmonary    (-) sleep apnea, not a smoker  Cardiovascular   Exercise tolerance: good (4-7 METS)    (-) hypertension, CAD      Neuro/Psych  (-) seizures, TIA, CVA  GI/Hepatic/Renal/Endo    (-) liver disease, no renal disease, diabetes    Musculoskeletal     Abdominal    Substance History      OB/GYN          Other                      Anesthesia Plan    ASA 1     general     intravenous induction     Anesthetic plan, risks, benefits, and alternatives have been provided, discussed and informed consent has been obtained with: patient.    CODE STATUS:

## 2023-08-17 NOTE — ANESTHESIA PROCEDURE NOTES
Airway  Urgency: elective    Date/Time: 8/17/2023 8:02 AM  Airway not difficult    General Information and Staff    Patient location during procedure: OR  CRNA/CAA: Agustin Ozuna CRNA    Indications and Patient Condition  Indications for airway management: airway protection    Preoxygenated: yes  Mask difficulty assessment: 1 - vent by mask    Final Airway Details  Final airway type: endotracheal airway      Successful airway: ETT  Cuffed: yes   Successful intubation technique: direct laryngoscopy  Facilitating devices/methods: intubating stylet  Endotracheal tube insertion site: oral  Blade: Arrington  Blade size: 2  ETT size (mm): 7.0  Cormack-Lehane Classification: grade IIa - partial view of glottis  Placement verified by: capnometry   Cuff volume (mL): 5  Measured from: lips  ETT/EBT  to lips (cm): 21  Number of attempts at approach: 1  Assessment: lips, teeth, and gum same as pre-op and atraumatic intubation

## 2023-08-17 NOTE — DISCHARGE INSTRUCTIONS
[facility] ENT  [address], [address]  [place] [address]    POSTOPERATIVE TONISLLECTOMY INSTRUCTIONS  Tonsillectomy is an outpatient surgical procedure performed under general anesthesia. The surgery lasts between 30-45 minutes. Normally, the patient will remain at the hospital for several hours after surgery for observation. Children 4 and under or with severe obstructive sleep apnea are usually admitted overnight for close monitoring. If a patient has severe bleeding, vomiting or low oxygen status, an overnight stay will be required. Most children take 10 days to recover from surgery. Older children, teens and adults typically take a little longer, closer to 12-14 days. No follow up visit is required unless the patient has a postop bleed. A staff member will call around 3 weeks after surgery to discuss recovery.     WHEN THE PATIENT COMES HOME  If the patient goes home and has postoperative bleeding that cannot be stopped within 15 minutes of gargling or drinking ice water, the patient needs to report to [facility] ER. In addition, it is imperative that patients drink after surgery. If a patient is not drinking, not urinating 2-3 times a day, crying without tear production or has dry tongue/mucous membranes, they will need to call the physician or present to the [facility] ER for fluids.   Please start drinking immediately after surgery, except for alcohol, purple or red fluids. The patient may have nausea and vomiting after surgery, but this should resolve within 24 hours after anesthesia wears off.   Minimum fluid intake for the first 24 hours after surgery by weight  Weight      Minimal fluid intake  Over 20 lbs.       34 ounces  Over 30 lbs.       42 ounces  Over 40 lbs.       50 ounces  Over 50 lbs.       58 ounces  Over 60 lbs.       68 ounces    EATING    A soft diet is recommended during the recovery period. Tonsillectomy patients may be reluctant to eat due  to pain: therefore, weight loss may occur. Do not force patients to eat; as long as they are drinking an appropriate fluid intake, eating is not mandatory. Have foods such as popsicles, pudding, slushies, macaroni, and mashed potatoes available if patient feels like eating. Please note that increased mucous will occur with dairy intake.   FEVER  A very common cause of fever in the postop tonsillectomy patient is dehydration. Encourage fluid intake with ice chips, cold or room temperature liquids and popsicles. A low-grade fever may be observed the night of surgery and for a few days after. When the patient starts to lose scabs of throat, they may spike a temperature. Treat fever with ibuprofen, Tylenol, and tepid baths.   IF A FEVER OF GREATER THAN 102 CONTINUES, CALL THE PHYSICIAN AS THIS MAY NOT BE FROM SURGERY.  PAIN  Pain after a tonsillectomy may be mild to severe. The pain will be in the throat and the patient will have referred pain to the ears. Ear pain is common and normal. Patient may also have neck and jaw pain.  You can use a warm compress for ear and jaw pain. You may use a cold compress or ice wrap around the neck for throat and neck pain. Please do not use heat or warm compresses on the neck/throat.  Patients often sleep with their mouth open after a tonsillectomy. The tonsil beds will dry out because of mouth breathing so pain will be worse if they wake up during night and in the morning. Please have patient drink when they are ready for bed and when they wake up in the morning. A cool mist vaporizer at night beside the bed may help with these symptoms.   PAIN CONTROL  The physician will prescribe liquid oxycodone for pain control. Pain medication should be given as prescribed. It is recommended that you give Tylenol or Ibuprofen when the patient wakes up, give them soft food and then in 30 minutes give ½ dose of pain medication. Let them continue to eat or drink and give the other ½ dose of pain  medication. This prevents nausea and vomiting from taking pain medication on an empty stomach.  You may supplement pain medication with ibuprofen. The pain medication contains Tylenol so be aware of amounts of Tylenol patient is taking as too much Tylenol can cause liver damage.  Ice packs and cold liquids can reduce pain as well. Narcotic pain medications can cause itching. If itching occurs, you may take Benadryl. Call the office or report to ER if symptoms involve swelling of throat or respiratory compromise.   BLEEDING  With the exception of small specks of blood from the nose or in the saliva, bright red blood should not be seen. If bleeding occurs, have patient gargle ice water and take note of color when they spit it out. If there is red color in the water being spit out, continue to gargle and spit until water being spit out is clear. If patient swallows blood, they will vomit. In addition, if blood is in the stomach, it will look like dark spicules describe as “coffee grounds”. If bleeding does not stop within 15 minutes, the patient will need to report to [facility] ER.   SCABS  A scab will form where the tonsils and adenoids were removed. The scabs are thick, white, and have a foul smell. THIS IS NORMAL. When the scabs come off, the patient will have an increase in pain, develop a fever, and have increased ear and throat pain. The scabs will start coming off around day 5 and continue until around day 10. A white coating may be in mouth and on tongue that resembles thrush but it is NOT thrush. Patient may rinse with saltwater, 1 tsp in 8 Oz of water, and spit water out 2-3 times a day. The uvula may become swollen due to surgery and equipment used. Cold fluids and ice chips can help symptoms and this should resolve in a few days. If the uvula restricts breathing, call the office or report to the ER.   NAUSEA and CONSTIPATION  Nausea and vomiting 24-48 hours post-op is often caused by general anesthesia  and should resolve when anesthesia is metabolized and eliminated from body. If you feel the patient's stomach upset is from pain medication, give medication with food and in divided doses over 20-30 minutes. If patient is on an antibiotic, eat 2-3 servings of live culture yogurt or give probiotic. If constipation develops, increase fluids. Patients may take a stool softener or laxative.    BREATHING  Snoring or mouth breathing may occur after surgery due to swelling in the throat. Normal breathing should return 10-14 days after surgery. Nasal congestion, nasal drainage, cough and excessive mucous may also occur.   ACTIVITY  Activity should be limited for 14 days following surgery. No strenuous physical activity or contact sports will not be allowed for 2 weeks. Patients may return to school before 2 weeks but with the aforementioned restrictions. Travel within the 2-week postoperative period away from the area your physician covers is not recommended.

## 2023-08-17 NOTE — OP NOTE
Cesar Parkinson MD   OPERATIVE NOTE    Ira Valenzuela  8/17/2023    Pre-op Diagnosis:   Chronic tonsillitis [J35.01]  Recurrent tonsillitis [J03.91]  Snoring [R06.83]  Sleep disorder breathing [G47.30]    Post-op Diagnosis:     Post-Op Diagnosis Codes:     * Chronic tonsillitis [J35.01]     * Recurrent tonsillitis [J03.91]     * Snoring [R06.83]     * Sleep disorder breathing [G47.30]    Procedure/CPT® Codes:  [place] TONSILLECTOMY & ADENOIDECTOMY <AGE 12 [68996]    Procedure(s):  tonsillectomy and adenoidectomy with coblation    Surgeon(s):  Cesar Parkinson MD    Anesthesia:   General    Staff:   Circulator: Jourdan Meléndez RN  Scrub Person: Hermila Hoffman; Jessy Lee    Estimated Blood Loss:   Minimal    Specimens:                Tonsils      Drains:  none    Findings:   Tonsils: 3+, Adenoids: 2+    Complications:   none    Reason for the Operation:  Ira Valenzuela is a 23 y.o. female who has had chronic tonsillitis. A tonsillectomy and adenoidectomy were recommended. The risks and benefits were explained including but not limited to early and late bleeding, infection, risks of the general anesthesia, dysphagia and poor PO intake, and voice change/VPI.  Alternatives were discussed. Questions were asked and appropriately answered.      Procedure Description:  The patient was taken back to the operating room, placed supine on the operating table and placed under anesthesia by the anesthesia staff. Once this was done a time out was performed to confirm the patient and the proper procedure.  A Susannah-Zack mouth gag was inserted and opened to its widest extent. The palate was examined and no submucous cleft palate noted. A tonsillectomy and an adenoidectomy was performed. Using meticulous dissection in the subcapsular plane the left and right tonsils were removed using coblation. Adequate hemostasis was assured with coblation. Instrument settings were at 7 ablation and 3 coagulation for this  portion of the procedure. To achieve palate retraction, a single red rubber catheter was inserted through the nose and brought out through the mouth. Using coblation, the adenoids were removed under indirect mirror visualization. Adequate hemostasis was assured with coblation prior to removing equipment. Instrument settings were at 9 ablation and 3 coagulation for this portion of the procedure.  The patient was then turned over to the anesthesia team and allowed to wake from anesthesia. The patient was transported to the recovery room in a stable condition.       Cesar Parkinson MD     Date: 8/17/2023  Time: 07:49 CDT

## 2023-08-18 ENCOUNTER — PATIENT MESSAGE (OUTPATIENT)
Dept: OBGYN CLINIC | Age: 23
End: 2023-08-18

## 2023-08-18 LAB
CYTO UR: NORMAL
LAB AP CASE REPORT: NORMAL
Lab: NORMAL
PATH REPORT.FINAL DX SPEC: NORMAL
PATH REPORT.GROSS SPEC: NORMAL

## 2023-08-18 NOTE — TELEPHONE ENCOUNTER
Tried to contact patient to schedule an appt but stated that the phone number had been disconnected.

## 2023-08-21 ENCOUNTER — TELEPHONE (OUTPATIENT)
Dept: OBGYN CLINIC | Age: 23
End: 2023-08-21

## 2023-08-21 NOTE — TELEPHONE ENCOUNTER
Pt called, and said she is pregnant, tried to reach out, please call patient for pregnancy confirmation, could not accommodate schedule she is former patient of Dr. Kj Cordoba, was to have had appt with Sixto Elizondo. Thanks !

## 2023-08-25 NOTE — TELEPHONE ENCOUNTER
Sebas Parra called to schedule a  pregnancy confirmation . Would like to see Divina Ham. Phone number has been updated. Please be advised that the best time to call her to accommodate their needs is Anytime. Thank you.

## 2023-08-30 RX ORDER — ONDANSETRON 4 MG/1
4 TABLET, ORALLY DISINTEGRATING ORAL 3 TIMES DAILY PRN
Qty: 21 TABLET | Refills: 0 | Status: SHIPPED | OUTPATIENT
Start: 2023-08-30

## 2023-09-10 SDOH — ECONOMIC STABILITY: HOUSING INSECURITY
IN THE LAST 12 MONTHS, WAS THERE A TIME WHEN YOU DID NOT HAVE A STEADY PLACE TO SLEEP OR SLEPT IN A SHELTER (INCLUDING NOW)?: NO

## 2023-09-10 SDOH — ECONOMIC STABILITY: INCOME INSECURITY: HOW HARD IS IT FOR YOU TO PAY FOR THE VERY BASICS LIKE FOOD, HOUSING, MEDICAL CARE, AND HEATING?: NOT VERY HARD

## 2023-09-10 SDOH — ECONOMIC STABILITY: FOOD INSECURITY: WITHIN THE PAST 12 MONTHS, YOU WORRIED THAT YOUR FOOD WOULD RUN OUT BEFORE YOU GOT MONEY TO BUY MORE.: SOMETIMES TRUE

## 2023-09-10 SDOH — ECONOMIC STABILITY: TRANSPORTATION INSECURITY
IN THE PAST 12 MONTHS, HAS LACK OF TRANSPORTATION KEPT YOU FROM MEETINGS, WORK, OR FROM GETTING THINGS NEEDED FOR DAILY LIVING?: NO

## 2023-09-10 SDOH — ECONOMIC STABILITY: FOOD INSECURITY: WITHIN THE PAST 12 MONTHS, THE FOOD YOU BOUGHT JUST DIDN'T LAST AND YOU DIDN'T HAVE MONEY TO GET MORE.: SOMETIMES TRUE

## 2023-09-10 ASSESSMENT — PATIENT HEALTH QUESTIONNAIRE - PHQ9
SUM OF ALL RESPONSES TO PHQ9 QUESTIONS 1 & 2: 2
2. FEELING DOWN, DEPRESSED OR HOPELESS: NOT AT ALL
1. LITTLE INTEREST OR PLEASURE IN DOING THINGS: 2
SUM OF ALL RESPONSES TO PHQ QUESTIONS 1-9: 2
1. LITTLE INTEREST OR PLEASURE IN DOING THINGS: MORE THAN HALF THE DAYS
2. FEELING DOWN, DEPRESSED OR HOPELESS: 0
SUM OF ALL RESPONSES TO PHQ QUESTIONS 1-9: 2
SUM OF ALL RESPONSES TO PHQ9 QUESTIONS 1 & 2: 2

## 2023-09-13 ENCOUNTER — OFFICE VISIT (OUTPATIENT)
Dept: OBGYN CLINIC | Age: 23
End: 2023-09-13
Payer: MEDICAID

## 2023-09-13 VITALS
DIASTOLIC BLOOD PRESSURE: 68 MMHG | WEIGHT: 168.8 LBS | BODY MASS INDEX: 28.09 KG/M2 | HEART RATE: 113 BPM | SYSTOLIC BLOOD PRESSURE: 114 MMHG

## 2023-09-13 DIAGNOSIS — N91.2 AMENORRHEA: Primary | ICD-10-CM

## 2023-09-13 DIAGNOSIS — N91.2 AMENORRHEA: ICD-10-CM

## 2023-09-13 DIAGNOSIS — R11.0 NAUSEA: ICD-10-CM

## 2023-09-13 LAB — GONADOTROPIN, CHORIONIC (HCG) QUANT: ABNORMAL MIU/ML (ref 0–5.3)

## 2023-09-13 PROCEDURE — 99214 OFFICE O/P EST MOD 30 MIN: CPT | Performed by: NURSE PRACTITIONER

## 2023-09-13 RX ORDER — ONDANSETRON 4 MG/1
4 TABLET, ORALLY DISINTEGRATING ORAL 3 TIMES DAILY PRN
Qty: 30 TABLET | Refills: 3 | Status: SHIPPED | OUTPATIENT
Start: 2023-09-13

## 2023-09-13 ASSESSMENT — ENCOUNTER SYMPTOMS
ALLERGIC/IMMUNOLOGIC NEGATIVE: 1
EYES NEGATIVE: 1
RESPIRATORY NEGATIVE: 1

## 2023-09-13 NOTE — PROGRESS NOTES
problem. Negative for frequency, pelvic pain, vaginal bleeding, vaginal discharge and vaginal pain. Musculoskeletal: Negative. Skin: Negative. Allergic/Immunologic: Negative. Neurological: Negative. Hematological: Negative. Psychiatric/Behavioral: Negative. Physical Exam  Vitals and nursing note reviewed. Constitutional:       Appearance: Normal appearance. She is well-developed. She is not diaphoretic. HENT:      Head: Normocephalic and atraumatic. Nose: Nose normal.   Eyes:      Extraocular Movements: Extraocular movements intact. Conjunctiva/sclera: Conjunctivae normal.      Pupils: Pupils are equal, round, and reactive to light. Neck:      Thyroid: No thyromegaly. Trachea: No tracheal deviation. Pulmonary:      Effort: Pulmonary effort is normal. No respiratory distress. Musculoskeletal:         General: Normal range of motion. Cervical back: Normal range of motion and neck supple. Comments: Normal ROM for upper and lower extremities. Gait steady. Skin:     General: Skin is warm and dry. Findings: No lesion or rash. Neurological:      Mental Status: She is alert and oriented to person, place, and time. Psychiatric:         Mood and Affect: Mood normal.         Speech: Speech normal.         Behavior: Behavior normal.          Diagnosis Orders   1. Amenorrhea  HCG, Quantitative, Pregnancy      2.  Nausea            MEDICATIONS:  Orders Placed This Encounter   Medications    ondansetron (ZOFRAN-ODT) 4 MG disintegrating tablet     Sig: Take 1 tablet by mouth 3 times daily as needed for Nausea or Vomiting     Dispense:  30 tablet     Refill:  3       ORDERS:  Orders Placed This Encounter   Procedures    HCG, Quantitative, Pregnancy       PLAN:  Amenorrhea- HCG levels today and will set up  for initial US and NOB  Nausea- discussed small frequent meals and starting zofran as needed with warning signs reviewed and indications to go to hospital for

## 2023-09-25 ASSESSMENT — ENCOUNTER SYMPTOMS
NAUSEA: 1
VOMITING: 0

## 2023-09-27 ENCOUNTER — OFFICE VISIT (OUTPATIENT)
Dept: FAMILY MEDICINE CLINIC | Facility: CLINIC | Age: 23
End: 2023-09-27
Payer: MEDICAID

## 2023-09-27 VITALS
SYSTOLIC BLOOD PRESSURE: 120 MMHG | HEIGHT: 64 IN | TEMPERATURE: 97.7 F | BODY MASS INDEX: 28.85 KG/M2 | HEART RATE: 85 BPM | WEIGHT: 169 LBS | RESPIRATION RATE: 18 BRPM | OXYGEN SATURATION: 99 % | DIASTOLIC BLOOD PRESSURE: 85 MMHG

## 2023-09-27 DIAGNOSIS — K92.1 BLOODY STOOL: Primary | ICD-10-CM

## 2023-09-27 DIAGNOSIS — K59.00 CONSTIPATION, UNSPECIFIED CONSTIPATION TYPE: ICD-10-CM

## 2023-09-27 DIAGNOSIS — Z3A.09 9 WEEKS GESTATION OF PREGNANCY: ICD-10-CM

## 2023-09-27 DIAGNOSIS — R63.4 WEIGHT LOSS: ICD-10-CM

## 2023-09-27 DIAGNOSIS — N39.0 URINARY TRACT INFECTION WITHOUT HEMATURIA, SITE UNSPECIFIED: ICD-10-CM

## 2023-09-27 LAB
DEVELOPER EXPIRATION DATE: ABNORMAL
DEVELOPER LOT NUMBER: ABNORMAL
EXPIRATION DATE: ABNORMAL
FECAL OCCULT BLOOD SCREEN, POC: POSITIVE
Lab: 1322
NEGATIVE CONTROL: POSITIVE
POSITIVE CONTROL: POSITIVE

## 2023-09-27 RX ORDER — PRENATAL VIT NO.126/IRON/FOLIC 28MG-0.8MG
TABLET ORAL DAILY
COMMUNITY

## 2023-09-27 RX ORDER — ONDANSETRON 4 MG/1
4 TABLET, ORALLY DISINTEGRATING ORAL
COMMUNITY
Start: 2023-09-13

## 2023-09-27 NOTE — PROGRESS NOTES
[facility]  [facility]   Family Medicine  [address]  [place] [address]  Phone: [phone]  Fax: [phone]         Chief Complaint:  Chief Complaint   Patient presents with    Establish Care     Painful stool movement, blood in stool        History:  Ira Valenzuela is a 23 y.o. female that is a new patient. She  is here for evaluation of the above complaint.    HPI     Bloody stool  9/27/2023: Going on for past year. She reports for 2 to 3 months she had diarrhea and lost 30 pounds. She had a scan of her gallbladder and stool collection which came back normal. She was advised to get a Celiac's work-up, however she was unable to. She has painful bowel a few times a week movements weekly but notes she is regular. She has had hemorrhoids in the past, she does not feel anything now. She has tried suppositories and stool softeners prior to her bowel movement. Denies itching. Sharp pain only present with actual passing of stool at the rectum. She has chronic abdominal pain under left rib.          ROS:  Review of Systems   Constitutional:  Negative for fatigue, fever and unexpected weight change.   HENT:  Negative for congestion, ear pain, rhinorrhea, sinus pressure, sinus pain and voice change.    Eyes:  Negative for visual disturbance.   Respiratory:  Negative for shortness of breath and wheezing.    Cardiovascular:  Negative for chest pain and palpitations.   Gastrointestinal:  Positive for blood in stool and constipation. Negative for abdominal pain, nausea and vomiting.   Genitourinary:  Negative for dysuria and flank pain.   Musculoskeletal:  Negative for back pain, myalgias and neck pain.   Skin:  Negative for color change and rash.   Neurological:  Negative for dizziness, weakness, numbness and headaches.   Psychiatric/Behavioral:  Negative for behavioral problems, dysphoric mood, self-injury and sleep disturbance.       reports that she has never smoked.  "She has never used smokeless tobacco. She reports that she does not drink alcohol and does not use drugs.    Current Outpatient Medications   Medication Instructions    amoxicillin-clavulanate (AUGMENTIN) 875-125 MG per tablet 1 tablet, Oral, 2 Times Daily    ondansetron ODT (ZOFRAN-ODT) 4 mg    prenatal vitamin (prenatal, CLASSIC, vitamin) tablet Oral, Daily    Unable to find Rectal Rockets: hydrocortisone, benzocaine, [place] Balsam, Menthol, Zinc.       OBJECTIVE:  /85 (BP Location: Left arm, Patient Position: Sitting, Cuff Size: Adult)   Pulse 85   Temp 97.7 °F (36.5 °C) (Infrared)   Resp 18   Ht 162 cm (63.78\")   Wt 76.7 kg (169 lb)   SpO2 99%   BMI 29.21 kg/m²    Physical Exam  Vitals and nursing note reviewed.   Constitutional:       Appearance: Normal appearance. She is well-developed.   HENT:      Head: Normocephalic and atraumatic.      Right Ear: Tympanic membrane, ear canal and external ear normal.      Left Ear: Tympanic membrane, ear canal and external ear normal.      Nose: Nose normal. No septal deviation, nasal tenderness or congestion.      Mouth/Throat:      Lips: Pink. No lesions.      Mouth: Mucous membranes are moist. No oral lesions.      Dentition: Normal dentition.      Pharynx: Oropharynx is clear. No pharyngeal swelling, oropharyngeal exudate or posterior oropharyngeal erythema.   Eyes:      General: Lids are normal. Vision grossly intact. No scleral icterus.        Right eye: No discharge.         Left eye: No discharge.      Extraocular Movements: Extraocular movements intact.      Conjunctiva/sclera: Conjunctivae normal.      Right eye: Right conjunctiva is not injected.      Left eye: Left conjunctiva is not injected.      Pupils: Pupils are equal, round, and reactive to light.   Neck:      Thyroid: No thyroid mass.      Trachea: Trachea normal.   Cardiovascular:      Rate and Rhythm: Normal rate and regular rhythm.      Heart sounds: Normal heart sounds. No murmur " heard.    No gallop.   Pulmonary:      Effort: Pulmonary effort is normal.      Breath sounds: Normal breath sounds and air entry. No wheezing, rhonchi or rales.   Musculoskeletal:         General: No tenderness or deformity. Normal range of motion.      Cervical back: Full passive range of motion without pain, normal range of motion and neck supple.      Thoracic back: Normal.      Right lower leg: No edema.      Left lower leg: No edema.   Skin:     General: Skin is warm and dry.      Coloration: Skin is not jaundiced.      Findings: No rash.   Neurological:      Mental Status: She is alert and oriented to person, place, and time.      Sensory: Sensation is intact.      Motor: Motor function is intact.      Coordination: Coordination is intact.      Gait: Gait is intact.      Deep Tendon Reflexes: Reflexes are normal and symmetric.   Psychiatric:         Mood and Affect: Mood and affect normal.         Behavior: Behavior normal.         Judgment: Judgment normal.       Rectal exam with positive Hemocult    Procedures    Assessment/Plan:     Diagnoses and all orders for this visit:    1. Bloody stool (Primary)  -     CBC Auto Differential; Future  -     Comprehensive Metabolic Panel; Future  -     T4, Free; Future  -     TSH; Future  -     Urinalysis With Culture If Indicated -; Future  -     Ambulatory Referral to Gastroenterology  -     POCT occult blood x 1 stool    2. 9 weeks gestation of pregnancy  -     CBC Auto Differential; Future  -     Comprehensive Metabolic Panel; Future  -     T4, Free; Future  -     TSH; Future  -     Urinalysis With Culture If Indicated -; Future  -     Ambulatory Referral to Gastroenterology    3. Weight loss    4. Urinary tract infection without hematuria, site unspecified  -     amoxicillin-clavulanate (AUGMENTIN) 875-125 MG per tablet; Take 1 tablet by mouth 2 (Two) Times a Day for 7 days.  Dispense: 14 tablet; Refill: 0    5. Constipation, unspecified constipation type    Other  orders  -     Discontinue: Unable to find; Rectal Rockets: hydrocortisone, benzocaine, [place] Balsam, Menthol, Zinc.  -     Unable to find; Rectal Rockets: hydrocortisone, benzocaine, [place] Balsam, Menthol, Zinc.  Dispense: 5 g; Refill: 0      1. Bloody stool. For past year patient has had sever diarrhea with weight loss and painful bowel movements. Her gallbladder scan and stool sample were normal. Patient in need of colonoscopy to evaluate, diarrhea, constipation and bloody stools however, she is 10 weeks pregnant. She has a history of hemorrhoids and has tried rectal suppositories with no relief. Referral to gastroenterology placed. Discussed potential for a fissure. Lab work ordered today. Rectal exam done today with a standby and hemoccult sample taken. I will look at pregnancy safe rectal medications, will do some research and contact patient with treatment options.         BMI is >= 25 and <30. (Overweight) The following options were offered after discussion;: nutrition counseling/recommendations    An After Visit Summary was printed and given to the patient at discharge.  No follow-ups on file.       Patient Instructions   Fasting labs     Docusate sodium 2 caps daily.     I will refer to gastroenterology.       Discussion:     I spent 30 minutes caring for Ira on this date of service. This time includes time spent by me in the following activities: preparing for the visit, reviewing tests, obtaining and/or reviewing a separately obtained history, performing a medically appropriate examination and/or evaluation, counseling and educating the patient/family/caregiver, ordering medications, tests, or procedures, referring and communicating with other health care professionals, documenting information in the medical record, independently interpreting results and communicating that information with the patient/family/caregiver, and care coordination     Lady BILL 9/29/2023   Electronically  signed.    Transcribed from ambient dictation for LANDY Bustamante by Chloé Arevalo.  09/27/23   14:05 CDT    Patient or patient representative verbalized consent to the visit recording.  I have personally performed the services described in this document as transcribed by the above individual, and it is both accurate and complete.

## 2023-09-28 ENCOUNTER — LAB (OUTPATIENT)
Dept: LAB | Facility: HOSPITAL | Age: 23
End: 2023-09-28
Payer: MEDICAID

## 2023-09-28 DIAGNOSIS — K92.1 BLOODY STOOL: ICD-10-CM

## 2023-09-28 DIAGNOSIS — Z3A.09 9 WEEKS GESTATION OF PREGNANCY: ICD-10-CM

## 2023-09-28 LAB
ALBUMIN SERPL-MCNC: 3.7 G/DL (ref 3.5–5.2)
ALBUMIN/GLOB SERPL: 1.4 G/DL
ALP SERPL-CCNC: 53 U/L (ref 39–117)
ALT SERPL W P-5'-P-CCNC: 14 U/L (ref 1–33)
ANION GAP SERPL CALCULATED.3IONS-SCNC: 9 MMOL/L (ref 5–15)
AST SERPL-CCNC: 15 U/L (ref 1–32)
BACTERIA UR QL AUTO: ABNORMAL /HPF
BASOPHILS # BLD AUTO: 0.02 10*3/MM3 (ref 0–0.2)
BASOPHILS NFR BLD AUTO: 0.3 % (ref 0–1.5)
BILIRUB SERPL-MCNC: 0.3 MG/DL (ref 0–1.2)
BILIRUB UR QL STRIP: NEGATIVE
BUN SERPL-MCNC: 7 MG/DL (ref 6–20)
BUN/CREAT SERPL: 13 (ref 7–25)
CALCIUM SPEC-SCNC: 8.5 MG/DL (ref 8.6–10.5)
CHLORIDE SERPL-SCNC: 105 MMOL/L (ref 98–107)
CLARITY UR: ABNORMAL
CO2 SERPL-SCNC: 24 MMOL/L (ref 22–29)
COLOR UR: ABNORMAL
CREAT SERPL-MCNC: 0.54 MG/DL (ref 0.57–1)
DEPRECATED RDW RBC AUTO: 38.8 FL (ref 37–54)
EGFRCR SERPLBLD CKD-EPI 2021: 133.7 ML/MIN/1.73
EOSINOPHIL # BLD AUTO: 0.05 10*3/MM3 (ref 0–0.4)
EOSINOPHIL NFR BLD AUTO: 0.9 % (ref 0.3–6.2)
ERYTHROCYTE [DISTWIDTH] IN BLOOD BY AUTOMATED COUNT: 12.6 % (ref 12.3–15.4)
GLOBULIN UR ELPH-MCNC: 2.7 GM/DL
GLUCOSE SERPL-MCNC: 88 MG/DL (ref 65–99)
GLUCOSE UR STRIP-MCNC: NEGATIVE MG/DL
HCT VFR BLD AUTO: 36.6 % (ref 34–46.6)
HGB BLD-MCNC: 12 G/DL (ref 12–15.9)
HGB UR QL STRIP.AUTO: NEGATIVE
HOLD SPECIMEN: NORMAL
HYALINE CASTS UR QL AUTO: ABNORMAL /LPF
IMM GRANULOCYTES # BLD AUTO: 0.01 10*3/MM3 (ref 0–0.05)
IMM GRANULOCYTES NFR BLD AUTO: 0.2 % (ref 0–0.5)
KETONES UR QL STRIP: ABNORMAL
LEUKOCYTE ESTERASE UR QL STRIP.AUTO: ABNORMAL
LYMPHOCYTES # BLD AUTO: 1.78 10*3/MM3 (ref 0.7–3.1)
LYMPHOCYTES NFR BLD AUTO: 30.7 % (ref 19.6–45.3)
MCH RBC QN AUTO: 27.8 PG (ref 26.6–33)
MCHC RBC AUTO-ENTMCNC: 32.8 G/DL (ref 31.5–35.7)
MCV RBC AUTO: 84.7 FL (ref 79–97)
MONOCYTES # BLD AUTO: 0.28 10*3/MM3 (ref 0.1–0.9)
MONOCYTES NFR BLD AUTO: 4.8 % (ref 5–12)
MUCOUS THREADS URNS QL MICRO: ABNORMAL /HPF
NEUTROPHILS NFR BLD AUTO: 3.66 10*3/MM3 (ref 1.7–7)
NEUTROPHILS NFR BLD AUTO: 63.1 % (ref 42.7–76)
NITRITE UR QL STRIP: POSITIVE
NRBC BLD AUTO-RTO: 0 /100 WBC (ref 0–0.2)
PH UR STRIP.AUTO: 7 [PH] (ref 5–8)
PLATELET # BLD AUTO: 213 10*3/MM3 (ref 140–450)
PMV BLD AUTO: 10.9 FL (ref 6–12)
POTASSIUM SERPL-SCNC: 3.8 MMOL/L (ref 3.5–5.2)
PROT SERPL-MCNC: 6.4 G/DL (ref 6–8.5)
PROT UR QL STRIP: ABNORMAL
RBC # BLD AUTO: 4.32 10*6/MM3 (ref 3.77–5.28)
RBC # UR STRIP: ABNORMAL /HPF
REF LAB TEST METHOD: ABNORMAL
SODIUM SERPL-SCNC: 138 MMOL/L (ref 136–145)
SP GR UR STRIP: 1.02 (ref 1–1.03)
SQUAMOUS #/AREA URNS HPF: ABNORMAL /HPF
T4 FREE SERPL-MCNC: 1.3 NG/DL (ref 0.93–1.7)
TSH SERPL DL<=0.05 MIU/L-ACNC: 0.5 UIU/ML (ref 0.27–4.2)
UROBILINOGEN UR QL STRIP: ABNORMAL
WBC # UR STRIP: ABNORMAL /HPF
WBC NRBC COR # BLD: 5.8 10*3/MM3 (ref 3.4–10.8)

## 2023-09-28 PROCEDURE — 87086 URINE CULTURE/COLONY COUNT: CPT

## 2023-09-28 PROCEDURE — 36415 COLL VENOUS BLD VENIPUNCTURE: CPT

## 2023-09-28 PROCEDURE — 87077 CULTURE AEROBIC IDENTIFY: CPT

## 2023-09-28 PROCEDURE — 80050 GENERAL HEALTH PANEL: CPT

## 2023-09-28 PROCEDURE — 84439 ASSAY OF FREE THYROXINE: CPT

## 2023-09-28 PROCEDURE — 81001 URINALYSIS AUTO W/SCOPE: CPT

## 2023-09-28 PROCEDURE — 87186 SC STD MICRODIL/AGAR DIL: CPT

## 2023-09-29 ENCOUNTER — INITIAL PRENATAL (OUTPATIENT)
Dept: OBGYN CLINIC | Age: 23
End: 2023-09-29

## 2023-09-29 VITALS
HEART RATE: 68 BPM | SYSTOLIC BLOOD PRESSURE: 128 MMHG | DIASTOLIC BLOOD PRESSURE: 78 MMHG | WEIGHT: 170 LBS | BODY MASS INDEX: 28.29 KG/M2

## 2023-09-29 DIAGNOSIS — Z13.79 GENETIC SCREENING: ICD-10-CM

## 2023-09-29 DIAGNOSIS — O02.1 MISSED AB: ICD-10-CM

## 2023-09-29 DIAGNOSIS — Z3A.10 10 WEEKS GESTATION OF PREGNANCY: Primary | ICD-10-CM

## 2023-09-29 DIAGNOSIS — Z34.81 ENCOUNTER FOR SUPERVISION OF OTHER NORMAL PREGNANCY IN FIRST TRIMESTER: ICD-10-CM

## 2023-09-29 DIAGNOSIS — O20.0 ABORTION, THREATENED: ICD-10-CM

## 2023-09-29 LAB
ABO/RH: NORMAL
ANTIBODY SCREEN: NORMAL
GONADOTROPIN, CHORIONIC (HCG) QUANT: ABNORMAL MIU/ML (ref 0–5.3)
HCV AB SERPL QL IA: NORMAL
PROGEST SERPL-MCNC: 20.19 NG/ML

## 2023-09-29 RX ORDER — AMOXICILLIN AND CLAVULANATE POTASSIUM 875; 125 MG/1; MG/1
1 TABLET, FILM COATED ORAL 2 TIMES DAILY
Qty: 14 TABLET | Refills: 0 | Status: SHIPPED | OUTPATIENT
Start: 2023-09-29 | End: 2023-10-06

## 2023-09-29 NOTE — PROGRESS NOTES
CNM Prenatal Office Note  Subjective:  Pasha Pérez is here for a return obstetrical visit. Today she is 10w1d weeks EGA. She is doing well and has no complaints. She  does not have vaginal bleeding, n/v, dizziness, or cramping. Objective: Mother's Prenatal Vitals  BP: 128/78  Weight - Scale: 170 lb (77.1 kg)  Pulse: 68  Patient Position: Sitting  Prenatal Fetal Information  Fetal HR: 161- u/s  Movement: Absent  Pt is A&Ox3, in no acute distress. Normocephalic, atraumatic. PERRL. Resp even and non-labored. Skin pink, warm & dry. Gravid abdomen. DE LA CRUZ's well. Gait steady. Assessment:    IUP at 10w1d wks      Diagnosis Orders   1. 10 weeks gestation of pregnancy        2. Encounter for supervision of other normal pregnancy in first trimester  HIV Obstetric Panel    Varicella Zoster Antibody, IgG    Herpes simplex virus (HSV) I/II antibodies IgG & IgM w/ reflex    Culture, Urine    Hepatitis C Antibody      3. Genetic screening          Plan:  Pt counseled on Genetic testing  Continue with routine prenatal care. RTC in 4 wks for prenatal visit    MEDICATIONS:  No orders of the defined types were placed in this encounter. ORDERS:  Orders Placed This Encounter   Procedures    Culture, Urine    HIV Obstetric Panel    Varicella Zoster Antibody, IgG    Herpes simplex virus (HSV) I/II antibodies IgG & IgM w/ reflex    Hepatitis C Antibody       More than 50% of this 20 min visit was education and counseling.

## 2023-09-29 NOTE — PROGRESS NOTES
Pt is here for routine parental care. Pt denies vaginal bleeding, cramping or leaking of fluid. She would like genetic screening today.

## 2023-09-30 LAB — BACTERIA SPEC AEROBE CULT: ABNORMAL

## 2023-10-01 LAB
BACTERIA UR CULT: ABNORMAL
BACTERIA UR CULT: ABNORMAL
ORGANISM: ABNORMAL

## 2023-10-02 ENCOUNTER — TELEPHONE (OUTPATIENT)
Dept: FAMILY MEDICINE CLINIC | Facility: CLINIC | Age: 23
End: 2023-10-02
Payer: MEDICAID

## 2023-10-02 LAB
BASOPHILS # BLD: 0 K/UL (ref 0–0.2)
BASOPHILS NFR BLD: 0.3 % (ref 0–1)
EOSINOPHIL # BLD: 0 K/UL (ref 0–0.6)
EOSINOPHIL NFR BLD: 0.5 % (ref 0–5)
ERYTHROCYTE [DISTWIDTH] IN BLOOD BY AUTOMATED COUNT: 12.8 % (ref 11.5–14.5)
HBV SURFACE AG SERPL QL IA: ABNORMAL
HCT VFR BLD AUTO: 39.3 % (ref 37–47)
HGB BLD-MCNC: 13 G/DL (ref 12–16)
HIV-1 P24 AG: ABNORMAL
HIV1+2 AB SERPLBLD QL IA.RAPID: ABNORMAL
IMM GRANULOCYTES # BLD: 0 K/UL
LYMPHOCYTES # BLD: 2 K/UL (ref 1.1–4.5)
LYMPHOCYTES NFR BLD: 26.4 % (ref 20–40)
MCH RBC QN AUTO: 28.7 PG (ref 27–31)
MCHC RBC AUTO-ENTMCNC: 33.1 G/DL (ref 33–37)
MCV RBC AUTO: 86.8 FL (ref 81–99)
MONOCYTES # BLD: 0.3 K/UL (ref 0–0.9)
MONOCYTES NFR BLD: 4.5 % (ref 0–10)
NEUTROPHILS # BLD: 5.1 K/UL (ref 1.5–7.5)
NEUTS SEG NFR BLD: 68.2 % (ref 50–65)
PLATELET # BLD AUTO: 244 K/UL (ref 130–400)
PMV BLD AUTO: 11.6 FL (ref 9.4–12.3)
RBC # BLD AUTO: 4.53 M/UL (ref 4.2–5.4)
RPR SER QL: ABNORMAL
RUBV IGG SER-ACNC: ABNORMAL [IU]/ML
WBC # BLD AUTO: 7.5 K/UL (ref 4.8–10.8)

## 2023-10-02 NOTE — TELEPHONE ENCOUNTER
Spoke with pharmacy tech and recevied verbal order to refill rectal rockets for patient. Pharmacy voiced understanding.

## 2023-10-04 LAB — VZV IGG SER QL IA: 0.65

## 2023-10-06 LAB
HSV1 GG IGG SER-ACNC: 45.6 IV
HSV1+2 IGG SER IA-ACNC: >22.4 IV
HSV1+2 IGM SER IA-ACNC: 0.75 IV
HSV2 GG IGG SER-ACNC: 1.6 IV

## 2023-10-25 ENCOUNTER — OFFICE VISIT (OUTPATIENT)
Dept: GASTROENTEROLOGY | Facility: CLINIC | Age: 23
End: 2023-10-25
Payer: MEDICAID

## 2023-10-25 VITALS
TEMPERATURE: 97 F | WEIGHT: 171 LBS | HEART RATE: 90 BPM | OXYGEN SATURATION: 99 % | DIASTOLIC BLOOD PRESSURE: 80 MMHG | BODY MASS INDEX: 30.3 KG/M2 | HEIGHT: 63 IN | SYSTOLIC BLOOD PRESSURE: 110 MMHG

## 2023-10-25 DIAGNOSIS — K62.5 RECTAL BLEED: Primary | ICD-10-CM

## 2023-10-25 NOTE — PROGRESS NOTES
Chief Complaint   Patient presents with    Diarrhea     Was having diarrhea better now has pain across stomach when she needs to have a b m    Abdominal Pain       PCP: Lady Murray APRN  REFER: Lady Murray*    Subjective     HPI      One year ago experienced 2 months of diarrhea and 30 lb weight loss.  Unremarkable stool studies and gallbladder workup.  Bowels improved without specific intervention.  She experiences rectal bleeding, color varies from bright red to dark blood.  Blood noted with every bowel movement.  Bowels move every 2-3 days.   She complains of pain when she has bowel movement.  She has utilized stool softener without releif.       Past Medical History:   Diagnosis Date    Headache     History of transfusion     Tonsillitis        Past Surgical History:   Procedure Laterality Date     SECTION  2022    TONSILLECTOMY AND ADENOIDECTOMY Bilateral 2023    Procedure: tonsillectomy and adenoidectomy with coblation;  Surgeon: Cesar Parkinson MD;  Location: [facility];  Service: ENT;  Laterality: Bilateral;       Outpatient Medications Marked as Taking for the 10/25/23 encounter (Office Visit) with Baldev Parra APRN   Medication Sig Dispense Refill    ondansetron ODT (ZOFRAN-ODT) 4 MG disintegrating tablet Take 1 tablet by mouth.      prenatal vitamin (prenatal, CLASSIC, vitamin) tablet Take  by mouth Daily.         No Known Allergies    Social History     Socioeconomic History    Marital status: Significant Other   Tobacco Use    Smoking status: Never    Smokeless tobacco: Never   Vaping Use    Vaping Use: Former   Substance and Sexual Activity    Alcohol use: Never    Drug use: Never    Sexual activity: Defer       Review of Systems   Constitutional:  Negative for fever and unexpected weight change.   HENT:  Negative for trouble swallowing.    Respiratory:  Negative for shortness of breath.    Cardiovascular:  Negative for chest pain.  "  Gastrointestinal:  Negative for abdominal pain and anal bleeding.       Objective     Vitals:    10/25/23 1413   BP: 110/80   Pulse: 90   Temp: 97 °F (36.1 °C)   SpO2: 99%   Weight: 77.6 kg (171 lb)   Height: 160 cm (63\")     Body mass index is 30.29 kg/m².    Physical Exam  Constitutional:       Appearance: Normal appearance. She is well-developed.   Eyes:      General: No scleral icterus.  Cardiovascular:      Heart sounds: Normal heart sounds. No murmur heard.  Pulmonary:      Effort: Pulmonary effort is normal.   Abdominal:      General: Bowel sounds are normal. There is no distension.      Palpations: Abdomen is soft.      Tenderness: There is no abdominal tenderness. There is no guarding.   Skin:     General: Skin is warm and dry.      Coloration: Skin is not jaundiced.   Neurological:      Mental Status: She is alert.   Psychiatric:         Behavior: Behavior is cooperative.         Imaging Results (Most Recent)       None            Body mass index is 30.29 kg/m².    Assessment & Plan     Diagnoses and all orders for this visit:    1. Rectal bleed (Primary)         * Surgery not found *      Unable to perform intervention to assess for causative factor of rectal bleeding due to Ira Valenzuela being pregnant.  It is recommended utilizing miralax (adjust dose as needed) on daily basis to help facilitate bowel movement.  Hopefully improvement in bowel movement will improve rectal bleeding.    Consider colonoscopy after delivery if symptoms persist        Baldev Parra, APRN  10/25/23          There are no Patient Instructions on file for this visit.    "

## 2023-10-27 ENCOUNTER — ROUTINE PRENATAL (OUTPATIENT)
Dept: OBGYN CLINIC | Age: 23
End: 2023-10-27
Payer: MEDICAID

## 2023-10-27 VITALS
HEART RATE: 100 BPM | SYSTOLIC BLOOD PRESSURE: 125 MMHG | DIASTOLIC BLOOD PRESSURE: 65 MMHG | WEIGHT: 169 LBS | BODY MASS INDEX: 28.12 KG/M2

## 2023-10-27 DIAGNOSIS — Z3A.14 14 WEEKS GESTATION OF PREGNANCY: ICD-10-CM

## 2023-10-27 DIAGNOSIS — O34.219 PREGNANCY WITH HISTORY OF CESAREAN SECTION, ANTEPARTUM: Primary | ICD-10-CM

## 2023-10-27 DIAGNOSIS — G43.019 INTRACTABLE MIGRAINE WITHOUT AURA AND WITHOUT STATUS MIGRAINOSUS: ICD-10-CM

## 2023-10-27 DIAGNOSIS — Z34.81 ENCOUNTER FOR SUPERVISION OF OTHER NORMAL PREGNANCY IN FIRST TRIMESTER: ICD-10-CM

## 2023-10-27 PROCEDURE — 99213 OFFICE O/P EST LOW 20 MIN: CPT | Performed by: NURSE PRACTITIONER

## 2023-10-27 RX ORDER — PROPRANOLOL HYDROCHLORIDE 10 MG/1
10 TABLET ORAL 3 TIMES DAILY
Qty: 90 TABLET | Refills: 3 | Status: SHIPPED | OUTPATIENT
Start: 2023-10-27

## 2023-10-27 NOTE — PROGRESS NOTES
Pt is here for routine parental care. Pt denies vaginal bleeding, cramping or leaking of fluid. Pt states + fetal movement. Patient has had some cramping. Wanting to discuss something she could do for migrans.

## 2023-10-27 NOTE — PATIENT INSTRUCTIONS
questions about a medical condition or this instruction, always ask your healthcare professional. [address] any warranty or liability for your use of this information.

## 2023-10-27 NOTE — PROGRESS NOTES
CNM Prenatal Office Note  Subjective:  Kian Avila is here for a return obstetrical visit. Today she is 14w1d weeks EGA. She is doing well, taking her PNV as directed, and has no complaints. She  does not have vaginal bleeding, n/v, syncope, or headaches. Pt does not feel fetal movement regularly. Objective: Mother's Prenatal Vitals  BP: 125/65  Weight - Scale: 76.7 kg (169 lb)  Pulse: 100  Patient Position: Sitting  Prenatal Fetal Information  Fetal HR: 147  Movement: Absent  Pt is A&Ox3, in no acute distress. Normocephalic, atraumatic. PERRL. Resp even and non-labored. Skin pink, warm & dry. Gravid abdomen. DE LA CRUZ's well. Gait steady. Assessment:    IUP at 14w1d wks      Diagnosis Orders   1. Pregnancy with history of  section, antepartum        2. 14 weeks gestation of pregnancy        3. Encounter for supervision of other normal pregnancy in first trimester        4. Intractable migraine without aura and without status migrainosus          Plan:  Pt counseled on balanced nutrition, adequate fluid intake, taking PNV daily, and exercise along with  review of prenatal labs   Continue with routine prenatal care. RTC in 4 wks for prenatal visit      MEDICATIONS:  Orders Placed This Encounter   Medications    propranolol (INDERAL) 10 MG tablet     Sig: Take 1 tablet by mouth 3 times daily     Dispense:  90 tablet     Refill:  3     ORDERS:  No orders of the defined types were placed in this encounter. More than 50% of this 20 min visit was education and counseling.

## 2023-11-17 ENCOUNTER — ROUTINE PRENATAL (OUTPATIENT)
Dept: OBGYN CLINIC | Age: 23
End: 2023-11-17
Payer: MEDICAID

## 2023-11-17 VITALS
DIASTOLIC BLOOD PRESSURE: 67 MMHG | WEIGHT: 170 LBS | HEART RATE: 90 BPM | SYSTOLIC BLOOD PRESSURE: 100 MMHG | BODY MASS INDEX: 28.29 KG/M2

## 2023-11-17 DIAGNOSIS — Z34.81 ENCOUNTER FOR SUPERVISION OF OTHER NORMAL PREGNANCY IN FIRST TRIMESTER: Primary | ICD-10-CM

## 2023-11-17 DIAGNOSIS — O34.219 PREGNANCY WITH HISTORY OF CESAREAN SECTION, ANTEPARTUM: ICD-10-CM

## 2023-11-17 DIAGNOSIS — F41.9 ANXIETY DURING PREGNANCY IN SECOND TRIMESTER, ANTEPARTUM: ICD-10-CM

## 2023-11-17 DIAGNOSIS — Z3A.17 17 WEEKS GESTATION OF PREGNANCY: ICD-10-CM

## 2023-11-17 DIAGNOSIS — O99.342 ANXIETY DURING PREGNANCY IN SECOND TRIMESTER, ANTEPARTUM: ICD-10-CM

## 2023-11-17 PROCEDURE — 99213 OFFICE O/P EST LOW 20 MIN: CPT | Performed by: NURSE PRACTITIONER

## 2023-11-17 NOTE — PROGRESS NOTES
Pt denies any vaginal leaking bleeding or contractions. + Fetal movement.    Pt states she noticed decreased movement with baby

## 2023-11-17 NOTE — PROGRESS NOTES
CNM Prenatal Office Note  Subjective:  Kian Avila is here for a return obstetrical visit. Today she is 17w1d weeks EGA. She is doing well, taking her PNV as directed, and has no complaints. She  does not have vaginal bleeding, n/v, syncope, or headaches. Pt does feel fetal movement regularly. Objective: Mother's Prenatal Vitals  BP: 100/67  Weight - Scale: 77.1 kg (170 lb)  Pulse: 90  Patient Position: Sitting  Prenatal Fetal Information  Fetal HR: 141  Movement: Increased  Pt is A&Ox3, in no acute distress. Normocephalic, atraumatic. PERRL. Resp even and non-labored. Skin pink, warm & dry. Gravid abdomen. DE LA CRUZ's well. Gait steady. Assessment:    IUP at 17w1d wks      Diagnosis Orders   1. Encounter for supervision of other normal pregnancy in second trimester        2. 17 weeks gestation of pregnancy        3. Pregnancy with history of  section, antepartum        4. Anxiety during pregnancy in second trimester, antepartum          Plan:  Pt counseled on balanced nutrition, adequate fluid intake, taking PNV daily, and exercise along with  upcoming anatomy scan  Continue with routine prenatal care. RTC in 4 wks for prenatal visit      MEDICATIONS:  No orders of the defined types were placed in this encounter. ORDERS:  No orders of the defined types were placed in this encounter. More than 50% of this 20 min visit was education and counseling.

## 2023-12-15 ENCOUNTER — ROUTINE PRENATAL (OUTPATIENT)
Dept: OBGYN CLINIC | Age: 23
End: 2023-12-15

## 2023-12-15 VITALS
HEART RATE: 93 BPM | DIASTOLIC BLOOD PRESSURE: 71 MMHG | BODY MASS INDEX: 28.29 KG/M2 | WEIGHT: 170 LBS | SYSTOLIC BLOOD PRESSURE: 108 MMHG

## 2023-12-15 DIAGNOSIS — O99.340 ANXIETY DURING PREGNANCY: ICD-10-CM

## 2023-12-15 DIAGNOSIS — Z3A.21 21 WEEKS GESTATION OF PREGNANCY: ICD-10-CM

## 2023-12-15 DIAGNOSIS — F41.9 ANXIETY DURING PREGNANCY: ICD-10-CM

## 2023-12-15 DIAGNOSIS — Z34.81 ENCOUNTER FOR SUPERVISION OF OTHER NORMAL PREGNANCY IN FIRST TRIMESTER: Primary | ICD-10-CM

## 2023-12-15 DIAGNOSIS — O34.219 PREGNANCY WITH HISTORY OF CESAREAN SECTION, ANTEPARTUM: ICD-10-CM

## 2023-12-15 NOTE — PROGRESS NOTES
CNM Prenatal Office Note  Subjective:  Fritz Faith is here for a return obstetrical visit. Today she is 21w1d weeks EGA. She is doing well, taking her PNV as directed, and has no complaints. She  does not have vaginal bleeding, n/v, syncope, or headaches. Pt does feel fetal movement regularly. Objective: Mother's Prenatal Vitals  BP: 108/71  Weight - Scale: 77.1 kg (170 lb)  Pulse: 93  Prenatal Fetal Information  Fetal HR: 133  Movement: Present  Pt is A&Ox3, in no acute distress. Normocephalic, atraumatic. PERRL. Resp even and non-labored. Skin pink, warm & dry. Gravid abdomen. DE LA CRUZ's well. Gait steady. Assessment:    IUP at 21w1d wks      Diagnosis Orders   1. Encounter for supervision of other normal pregnancy in second trimester        2. 21 weeks gestation of pregnancy        3. Anxiety during pregnancy        4. Pregnancy with history of  section, antepartum          Plan:  Pt counseled on balanced nutrition, adequate fluid intake, taking PNV daily, and exercise along with  review of anatomy scan  normal  Continue with routine prenatal care. RTC in 4 wks for prenatal visit      MEDICATIONS:  No orders of the defined types were placed in this encounter. ORDERS:  No orders of the defined types were placed in this encounter. More than 50% of this 20 min visit was education and counseling.

## 2023-12-19 PROBLEM — Z3A.21 21 WEEKS GESTATION OF PREGNANCY: Status: ACTIVE | Noted: 2023-12-19

## 2024-01-24 ENCOUNTER — ROUTINE PRENATAL (OUTPATIENT)
Dept: OBGYN CLINIC | Age: 24
End: 2024-01-24
Payer: MEDICAID

## 2024-01-24 VITALS
DIASTOLIC BLOOD PRESSURE: 65 MMHG | BODY MASS INDEX: 29.62 KG/M2 | WEIGHT: 178 LBS | HEART RATE: 118 BPM | SYSTOLIC BLOOD PRESSURE: 131 MMHG

## 2024-01-24 DIAGNOSIS — O26.892 DYSURIA DURING PREGNANCY IN SECOND TRIMESTER: ICD-10-CM

## 2024-01-24 DIAGNOSIS — F41.9 ANXIETY DURING PREGNANCY: ICD-10-CM

## 2024-01-24 DIAGNOSIS — R30.0 DYSURIA DURING PREGNANCY IN SECOND TRIMESTER: ICD-10-CM

## 2024-01-24 DIAGNOSIS — O34.219 PREGNANCY WITH HISTORY OF CESAREAN SECTION, ANTEPARTUM: Primary | ICD-10-CM

## 2024-01-24 DIAGNOSIS — O99.340 ANXIETY DURING PREGNANCY: ICD-10-CM

## 2024-01-24 DIAGNOSIS — Z3A.26 26 WEEKS GESTATION OF PREGNANCY: ICD-10-CM

## 2024-01-24 DIAGNOSIS — Z34.81 ENCOUNTER FOR SUPERVISION OF OTHER NORMAL PREGNANCY IN FIRST TRIMESTER: ICD-10-CM

## 2024-01-24 LAB
BASOPHILS # BLD: 0 K/UL (ref 0–0.2)
BASOPHILS NFR BLD: 0.3 % (ref 0–1)
EOSINOPHIL # BLD: 0 K/UL (ref 0–0.6)
EOSINOPHIL NFR BLD: 0.5 % (ref 0–5)
ERYTHROCYTE [DISTWIDTH] IN BLOOD BY AUTOMATED COUNT: 12.8 % (ref 11.5–14.5)
GLUCOSE 1H P MEAL SERPL-MCNC: 88 MG/DL (ref 75–140)
HCT VFR BLD AUTO: 30.6 % (ref 37–47)
HGB BLD-MCNC: 9.8 G/DL (ref 12–16)
IMM GRANULOCYTES # BLD: 0 K/UL
LYMPHOCYTES # BLD: 1.6 K/UL (ref 1.1–4.5)
LYMPHOCYTES NFR BLD: 20.7 % (ref 20–40)
MCH RBC QN AUTO: 27 PG (ref 27–31)
MCHC RBC AUTO-ENTMCNC: 32 G/DL (ref 33–37)
MCV RBC AUTO: 84.3 FL (ref 81–99)
MONOCYTES # BLD: 0.4 K/UL (ref 0–0.9)
MONOCYTES NFR BLD: 4.6 % (ref 0–10)
NEUTROPHILS # BLD: 5.8 K/UL (ref 1.5–7.5)
NEUTS SEG NFR BLD: 73.5 % (ref 50–65)
PLATELET # BLD AUTO: 264 K/UL (ref 130–400)
PMV BLD AUTO: 11.3 FL (ref 9.4–12.3)
RBC # BLD AUTO: 3.63 M/UL (ref 4.2–5.4)
WBC # BLD AUTO: 7.8 K/UL (ref 4.8–10.8)

## 2024-01-24 PROCEDURE — 99213 OFFICE O/P EST LOW 20 MIN: CPT | Performed by: NURSE PRACTITIONER

## 2024-01-24 NOTE — PROGRESS NOTES
CNM Prenatal Office Note  Subjective:  Korin Tai is here for a return obstetrical visit. Today she is 26w6d weeks EGA. She is doing well, taking her PNV as directed, and has no complaints.  She  does not have vaginal bleeding, n/v, syncope, or headaches.  Pt does feel fetal movement regularly.    Objective:  Mother's Prenatal Vitals  BP: 131/65  Weight - Scale: 80.7 kg (178 lb)  Pulse: (!) 118  Patient Position: Sitting  Prenatal Fetal Information  Fundal Height (cm): 26 cm  Fetal HR: 150  Movement: Present  Pt is A&Ox3, in no acute distress. Normocephalic, atraumatic. PERRL. Resp even and non-labored. Skin pink, warm & dry. Gravid abdomen. DE LA CRUZ's well. Gait steady.   Assessment:    IUP at 26w6d wks      Diagnosis Orders   1. Pregnancy with history of  section, antepartum        2. 26 weeks gestation of pregnancy        3. Encounter for supervision of other normal pregnancy in second trimester  Glucose 1 Hour Postprandial    CBC with Auto Differential      4. Anxiety during pregnancy        5. Dysuria during pregnancy in second trimester  Culture, Urine        Plan:  Pt counseled on balanced nutrition, adequate fluid intake, taking PNV daily, and exercise along with  upcoming glucose screening, GHTN precautions, Kick count, and  labor  Continue with routine prenatal care.  Return in about 2 weeks (around 2024) for prenatal.       MEDICATIONS:  No orders of the defined types were placed in this encounter.    ORDERS:  Orders Placed This Encounter   Procedures    Culture, Urine    Glucose 1 Hour Postprandial    CBC with Auto Differential       More than 50% of this 20 min visit was education and counseling.

## 2024-01-27 LAB
BACTERIA UR CULT: ABNORMAL
BACTERIA UR CULT: ABNORMAL
ORGANISM: ABNORMAL

## 2024-01-29 RX ORDER — NITROFURANTOIN 25; 75 MG/1; MG/1
100 CAPSULE ORAL 2 TIMES DAILY
Qty: 20 CAPSULE | Refills: 0 | Status: SHIPPED | OUTPATIENT
Start: 2024-01-29 | End: 2024-02-08

## 2024-02-05 PROBLEM — O99.340 ANXIETY DURING PREGNANCY: Status: ACTIVE | Noted: 2024-02-05

## 2024-02-05 PROBLEM — F41.9 ANXIETY DURING PREGNANCY: Status: ACTIVE | Noted: 2024-02-05

## 2024-02-07 ENCOUNTER — ROUTINE PRENATAL (OUTPATIENT)
Dept: OBGYN CLINIC | Age: 24
End: 2024-02-07
Payer: MEDICAID

## 2024-02-07 VITALS
SYSTOLIC BLOOD PRESSURE: 107 MMHG | BODY MASS INDEX: 29.79 KG/M2 | WEIGHT: 179 LBS | DIASTOLIC BLOOD PRESSURE: 74 MMHG | HEART RATE: 102 BPM

## 2024-02-07 DIAGNOSIS — R39.9 UTI SYMPTOMS: ICD-10-CM

## 2024-02-07 DIAGNOSIS — Z3A.28 28 WEEKS GESTATION OF PREGNANCY: Primary | ICD-10-CM

## 2024-02-07 DIAGNOSIS — Z34.83 ENCOUNTER FOR SUPERVISION OF OTHER NORMAL PREGNANCY IN THIRD TRIMESTER: ICD-10-CM

## 2024-02-07 PROCEDURE — 99213 OFFICE O/P EST LOW 20 MIN: CPT | Performed by: NURSE PRACTITIONER

## 2024-02-07 NOTE — PROGRESS NOTES
Pt is here for routine parental care. Pt denies vaginal bleeding, cramping or leaking of fluid. Pt states + fetal movement. She finished Macrobid and she still feels like her urine is very strong, denies any pain or discharge with urinating.

## 2024-02-07 NOTE — PATIENT INSTRUCTIONS
Patient Education        Weeks 26 to 30 of Your Pregnancy: Care Instructions  You're starting your last trimester. You'll probably feel your baby moving around more. Your back may ache as your body gets used to your baby's size and length. Take care of yourself, and pay attention to what your body needs.    Talk to your doctor about getting the Tdap shot. It will help protect your  against whooping cough (pertussis). Also ask your doctor about flu and COVID-19 shots if you haven't had them yet. If your blood type is Rh negative, you may be given a shot of Rh immune globulin (such as RhoGAM). It can help prevent problems for your baby.   You may have [place] contractions. They are single or several strong contractions without a pattern. These are practice contractions but not the start of labor.   Be kind to yourself.     Take breaks when you're tired.  Change positions often. Don't sit for too long or stand for too long.  At work, rest during breaks if you can. If you don't get breaks, talk to your doctor about writing a letter to your employer to request them.  Avoid fumes, chemicals, and tobacco smoke.  Be sexual if you want to.     You may be interested in sex, or you may not. Everyone is different.  Sex is okay unless your doctor tells you not to.  Your belly can make it hard to find good positions for sex. [place] and explore.  Watch for signs of  labor.    These signs include:   Menstrual-like cramps. Or you may have pain or pressure in your pelvis that happens in a pattern.  About 6 or more contractions in an hour (even after rest and a glass of water).  A low, dull backache that doesn't go away when you change positions.  An increase or change in vaginal discharge.  Light vaginal bleeding or spotting.  Your water breaking.  Know what to do if you think you are having contractions.     Drink 1 or 2 glasses of water.  Lie down on your left side for at least an hour.  While on your side,

## 2024-02-07 NOTE — PROGRESS NOTES
[facility] Prenatal Office Note  Subjective:  Korin Tai is here for a return obstetrical visit. Today she is 28w6d weeks EGA. She is doing well, taking her PNV as directed, and has no complaints.  She  does not have vaginal bleeding, n/v, syncope, or headaches.  Pt does feel fetal movement regularly.  She finished Macrobid and she still feels like her urine is very strong, denies any pain or discharge with urinating.      Objective:  Mother's Prenatal Vitals  BP: 107/74  Weight - Scale: 81.2 kg (179 lb)  Pulse: (!) 102  Patient Position: Sitting  Prenatal Fetal Information  Fetal HR:   Movement: Present  Pt is A&Ox3, in no acute distress. Normocephalic, atraumatic. PERRL. Resp even and non-labored. Skin pink, warm & dry. Gravid abdomen. DE LA CRUZ's well. Gait steady.   Assessment:    IUP at 28w6d wks      Diagnosis Orders   1. 28 weeks gestation of pregnancy        2. Encounter for supervision of other normal pregnancy in third trimester          Plan:  Pt counseled on balanced nutrition, adequate fluid intake, taking PNV daily, and exercise along with GHTN precautions, Kick count, and  labor  Continue with routine prenatal care.   surveillance indicated for bilateral renal pelvictasis  Bpp   No follow-ups on file.     MEDICATIONS:  No orders of the defined types were placed in this encounter.    ORDERS:  No orders of the defined types were placed in this encounter.      I SHAZIA Waller, am scribing for and in the presence of Riddhi Aguilar CNM.  2024 11:14 AM     More than 50% of this 20 min visit was education and counseling.      I have seen and examined the patient independently.  I reviewed all laboratory and imaging studies that are relevant.  I have reviewed and made any appropriate changes to the HPI.    Electronically signed by RERE Busch CNM on 2024 at 11:37 PM

## 2024-02-09 LAB
BACTERIA UR CULT: ABNORMAL
BACTERIA UR CULT: ABNORMAL
ORGANISM: ABNORMAL

## 2024-02-11 ENCOUNTER — HOSPITAL ENCOUNTER (OUTPATIENT)
Age: 24
Discharge: HOME | End: 2024-02-11
Attending: ADVANCED PRACTICE MIDWIFE | Admitting: ADVANCED PRACTICE MIDWIFE
Payer: MEDICAID

## 2024-02-11 VITALS — TEMPERATURE: 97 F

## 2024-02-11 PROBLEM — Z3A.29 29 WEEKS GESTATION OF PREGNANCY: Status: ACTIVE | Noted: 2024-02-11

## 2024-02-11 LAB
AMORPH SED URNS QL MICRO: ABNORMAL /HPF
BACTERIA #/AREA URNS HPF: ABNORMAL /HPF
BILIRUB UR QL STRIP: NEGATIVE
CLARITY UR: CLEAR
COLOR UR: YELLOW
GLUCOSE UR STRIP.AUTO-MCNC: NEGATIVE MG/DL
HGB UR STRIP.AUTO-MCNC: NEGATIVE MG/L
KETONES UR STRIP.AUTO-MCNC: NEGATIVE MG/DL
LEUKOCYTE ESTERASE UR QL STRIP.AUTO: ABNORMAL
NITRITE UR QL STRIP.AUTO: POSITIVE
PH UR STRIP.AUTO: 6.5 [PH] (ref 5–8)
PROT UR STRIP.AUTO-MCNC: NEGATIVE MG/DL
RBC #/AREA URNS HPF: ABNORMAL /HPF (ref 0–2)
SP GR UR STRIP.AUTO: 1.01 (ref 1–1.03)
SQUAMOUS #/AREA URNS HPF: ABNORMAL /HPF
UROBILINOGEN UR STRIP.AUTO-MCNC: 1 E.U./DL
WBC #/AREA URNS HPF: ABNORMAL /HPF (ref 0–5)

## 2024-02-11 PROCEDURE — 81001 URINALYSIS AUTO W/SCOPE: CPT

## 2024-02-11 PROCEDURE — 96374 THER/PROPH/DIAG INJ IV PUSH: CPT

## 2024-02-11 PROCEDURE — 2580000003 HC RX 258: Performed by: ADVANCED PRACTICE MIDWIFE

## 2024-02-11 PROCEDURE — 6360000002 HC RX W HCPCS: Performed by: ADVANCED PRACTICE MIDWIFE

## 2024-02-11 PROCEDURE — 96361 HYDRATE IV INFUSION ADD-ON: CPT

## 2024-02-11 PROCEDURE — 99212 OFFICE O/P EST SF 10 MIN: CPT

## 2024-02-11 RX ORDER — SODIUM CHLORIDE, SODIUM LACTATE, POTASSIUM CHLORIDE, AND CALCIUM CHLORIDE .6; .31; .03; .02 G/100ML; G/100ML; G/100ML; G/100ML
1000 INJECTION, SOLUTION INTRAVENOUS ONCE
Status: COMPLETED | OUTPATIENT
Start: 2024-02-11 | End: 2024-02-11

## 2024-02-11 RX ADMIN — WATER 1000 MG: 1 INJECTION INTRAMUSCULAR; INTRAVENOUS; SUBCUTANEOUS at 22:55

## 2024-02-11 RX ADMIN — SODIUM CHLORIDE, POTASSIUM CHLORIDE, SODIUM LACTATE AND CALCIUM CHLORIDE 1000 ML: 600; 310; 30; 20 INJECTION, SOLUTION INTRAVENOUS at 20:43

## 2024-02-12 NOTE — PROGRESS NOTES
CARLA Norton CNM called with patient urine results and positive culture from 2 days ago. See new order for abx. Discharge to home after abx are given.

## 2024-02-12 NOTE — PROGRESS NOTES
Patient discharge instructions given. Advised of when to return and when to call OB. Advised to keep next scheduled appt with ROSIE Aguilar on 2/21. Patient states understanding and denies questions at this time. Patient ambulated in stable condition off nursing unit.

## 2024-02-12 NOTE — DISCHARGE INSTRUCTIONS
LABOR AND DELIVERY - OBSERVATION DISCHARGE INSTRUCTIONS    TERM LABOR: RETURN TO HOSPITAL IF:  __There is a leaking or sudden gush of fluid from the vagina (note color, odor, time and amount of fluid)    __ You have bright red vaginal bleeding    __You begin to have regular contractions, occuring every 10-15 minutes, each contraction lasting 45-60 seconds for one hour. Time contractions from beginning of one to the beginning of the next. True contractions have a regular rate and become progressively closer. They are not changed by position change and are usually more uncomfortable when walking.    PRE-TERM LABOR  __Your gestational age is 29 weeks: term pregnancy starts at 37 weeks.      RETURN TO HOSPITAL IF:  __Contractions occur 3-4 in any hour (every 10-15 minutes), maybe with or without pain.  __Rhythmic or constant menstrual-like cramps  __Rhythmic or constant lower, dull backache may radiate to the sides or front. Increase or change in vaginal discharge, may be watery, pink, red or brown-tinged.            NAUSEA/VOMITING HYPEREMESIS  __Eat small, frequent meals instead of three large meals  __Drink liquids (such as 7-up or ginger ale) between meals instead of with meals  __Eat a few crackers or toast before getting out of bed in the morning    URINARY TRACT INFECTION    __Drink 8-10 glasses of water, juice or decaffeinated beverages a day.  __Take two regular strength Tylenol every 4 hours as needed for pain or fever (NO ASPIRIN PRODUCTS)  __Cleanse vaginal area from front to back  __Completely empty bladder and avoid postponing urination  __Notify your physician of elevated temperature        RETURN TO HOSPITAL IMMEDIATELY IF:  __Vaginal bleeding, usually bright red and painless. May be intermittent, may come in gushes or continuous.    __It is important to note baby activity, evidence on pre-term labor or bleeding and tight feeling abdomen  __If you notice a decrease in fetal movement , notify your

## 2024-02-12 NOTE — PROGRESS NOTES
Patient arrived ambulatory to triage for c/o continuous upper abdominal pain that started last night. Patient voided and was placed on EFM. VS WNL. Denies leakage of fluid and vaginal bleeding. States positive fetal movement. Abdomen soft all over but tender to touch at fundus, does not guard or grimace with palpation. Denies N/V. No edema noted. No current headache, but history of migraines during pregnancy. Denies vision changes. States pain is constant, 6-8, feels like tight feeling and sore, when it gets a little less it seems to be more RUQ rather than across the abdomen. Nothing makes the pain better or worse. Did not inhibit sleep last night but did get worse during the day today. Medications, allergies and history reviewed with patient and is up to date.  R. Johnson called about patient arrival, complain and assessment. See new orders.

## 2024-02-15 DIAGNOSIS — O23.43 RECURRENT URINARY TRACT INFECTION AFFECTING PREGNANCY IN THIRD TRIMESTER: Primary | ICD-10-CM

## 2024-02-21 ENCOUNTER — ROUTINE PRENATAL (OUTPATIENT)
Dept: OBGYN CLINIC | Age: 24
End: 2024-02-21
Payer: MEDICAID

## 2024-02-21 VITALS
HEART RATE: 90 BPM | SYSTOLIC BLOOD PRESSURE: 106 MMHG | BODY MASS INDEX: 30.95 KG/M2 | DIASTOLIC BLOOD PRESSURE: 72 MMHG | WEIGHT: 186 LBS

## 2024-02-21 DIAGNOSIS — Z3A.30 30 WEEKS GESTATION OF PREGNANCY: ICD-10-CM

## 2024-02-21 DIAGNOSIS — N39.0 URINARY TRACT INFECTION WITHOUT HEMATURIA, SITE UNSPECIFIED: ICD-10-CM

## 2024-02-21 DIAGNOSIS — O23.43 RECURRENT URINARY TRACT INFECTION AFFECTING PREGNANCY IN THIRD TRIMESTER: ICD-10-CM

## 2024-02-21 DIAGNOSIS — F41.9 ANXIETY DURING PREGNANCY: ICD-10-CM

## 2024-02-21 DIAGNOSIS — N39.0 URINARY TRACT INFECTION WITHOUT HEMATURIA, SITE UNSPECIFIED: Primary | ICD-10-CM

## 2024-02-21 DIAGNOSIS — O99.340 ANXIETY DURING PREGNANCY: ICD-10-CM

## 2024-02-21 PROCEDURE — 99213 OFFICE O/P EST LOW 20 MIN: CPT | Performed by: NURSE PRACTITIONER

## 2024-02-21 NOTE — PROGRESS NOTES
[facility] Prenatal Office Note  Subjective:  Korin Tai is here for a return obstetrical visit. Today she is 30w6d weeks EGA. She is doing well, taking her PNV as directed, and has no complaints.  She  does not have vaginal bleeding, n/v, syncope, or headaches.  Pt does feel fetal movement regularly.    Objective:  Mother's Prenatal Vitals  BP: 106/72  Weight - Scale: 84.4 kg (186 lb)  Pulse: 90  Prenatal Fetal Information  Fundal Height (cm): 32 cm  Fetal HR: 128  Movement: Present  Pt is A&Ox3, in no acute distress. Normocephalic, atraumatic. PERRL. Resp even and non-labored. Skin pink, warm & dry. Gravid abdomen. DE LA CRUZ's well. Gait steady.   Assessment:    IUP at 30w6d wks      Diagnosis Orders   1. Urinary tract infection without hematuria, site unspecified  Culture, Urine      2. 30 weeks gestation of pregnancy        3. Anxiety during pregnancy        4. Recurrent urinary tract infection affecting pregnancy in third trimester          Plan:  Pt counseled on balanced nutrition, adequate fluid intake, taking PNV daily, and exercise along with GHTN precautions, Kick count, and  labor  Continue with routine prenatal care.   surveillance not indicated  Return in about 2 weeks (around 3/6/2024) for prenatal.     MEDICATIONS:  No orders of the defined types were placed in this encounter.    ORDERS:  Orders Placed This Encounter   Procedures    Culture, Urine       More than 50% of this 20 min visit was education and counseling.      SHAZIA Damon, am scribing for and in the presence of Riddhi Aguilar CNM.  2024 12:03 PM     I have seen and examined the patient independently.  I reviewed all laboratory and imaging studies that are relevant.  I have reviewed and made any appropriate changes to the HPI.    Electronically signed by RERE Busch CNM on 2024 at 12:03 PM

## 2024-02-21 NOTE — PATIENT INSTRUCTIONS
Patient Education        Weeks 30 to 32 of Your Pregnancy: Care Instructions  Your baby is growing more every day. Its eyes can open and close, and it may have hair on its head. Your baby may sleep 20 to 45 minutes at a time and is more active at certain times.    You should feel your baby move several times every day. Your baby now turns less and kicks more.   This is a good time to tour your hospital or birthing center. You may also want to find childcare if needed.     To ease heartburn    Avoid foods that make your symptoms worse, such as chocolate, spicy foods, and caffeine.  Avoid bending over or lying down after meals.  Do not eat for 2 hours before bedtime.  Take antacids like Tums, but don't take ones that have sodium bicarbonate, magnesium trisilicate, or aspirin.    To care for large, swollen veins (varicose veins)    Try to avoid standing for long periods of time.  Sit with your feet propped up.  Wear support hose.  Get some exercise every day, like walking or swimming.  Counting your baby's kicks  Your doctor may ask you to count your baby's movements, such as kicks, flutters, or rolls.    Find a quiet place, and get comfortable. Write down your start time. Count your baby's movements (except hiccups). When your baby has moved 10 times, you can stop counting. Write down how many minutes it took.   If an hour goes by and you don't feel 10 movements, have something to eat or drink. Count for another hour. If you don't feel at least 10 movements in the 2-hour period, call your doctor.   Follow-up care is a key part of your treatment and safety. Be sure to make and go to all appointments, and call your doctor if you are having problems. It's also a good idea to know your test results and keep a list of the medicines you take.  Where can you learn more?  Go to https://www.[employer].net/patientEd and enter X471 to learn more about \"Weeks 30 to 32 of Your Pregnancy: Care Instructions.\"  Current as of: July

## 2024-02-23 LAB — BACTERIA UR CULT: NORMAL

## 2024-03-06 ENCOUNTER — ROUTINE PRENATAL (OUTPATIENT)
Dept: OBGYN CLINIC | Age: 24
End: 2024-03-06
Payer: MEDICAID

## 2024-03-06 VITALS
HEART RATE: 108 BPM | WEIGHT: 188 LBS | DIASTOLIC BLOOD PRESSURE: 70 MMHG | SYSTOLIC BLOOD PRESSURE: 132 MMHG | BODY MASS INDEX: 31.28 KG/M2

## 2024-03-06 DIAGNOSIS — Z3A.32 32 WEEKS GESTATION OF PREGNANCY: ICD-10-CM

## 2024-03-06 DIAGNOSIS — F41.9 ANXIETY DURING PREGNANCY: ICD-10-CM

## 2024-03-06 DIAGNOSIS — O99.340 ANXIETY DURING PREGNANCY: ICD-10-CM

## 2024-03-06 DIAGNOSIS — Z34.83 ENCOUNTER FOR SUPERVISION OF OTHER NORMAL PREGNANCY IN THIRD TRIMESTER: Primary | ICD-10-CM

## 2024-03-06 PROCEDURE — 99213 OFFICE O/P EST LOW 20 MIN: CPT | Performed by: NURSE PRACTITIONER

## 2024-03-06 NOTE — PROGRESS NOTES
[facility] Prenatal Office Note  Subjective:  Korin Tai is here for a return obstetrical visit. Today she is 32w6d weeks EGA. She is doing well, taking her PNV as directed, and has no complaints.  She  does not have vaginal bleeding, n/v, syncope, or headaches.  Pt does feel fetal movement regularly.  Pt does complain of itching at night.  Objective:  Mother's Prenatal Vitals  BP: 132/70  Weight - Scale: 85.3 kg (188 lb)  Pulse: (!) 108  Patient Position: Sitting  Prenatal Fetal Information  Movement: Present  Pt is A&Ox3, in no acute distress. Normocephalic, atraumatic. PERRL. Resp even and non-labored. Skin pink, warm & dry. Gravid abdomen. DE LA CRUZ's well. Gait steady.   Assessment:    IUP at 32w6d wks      Diagnosis Orders   1. 32 weeks gestation of pregnancy        2. Anxiety during pregnancy        3. Encounter for supervision of other normal pregnancy in third trimester          Plan:  Pt counseled on balanced nutrition, adequate fluid intake, taking PNV daily, and exercise along with GHTN precautions, Kick count, and  labor  Continue with routine prenatal care.   surveillance indicated for bilateral renal pelvictasis (resolved)  Return in about 2 weeks (around 3/20/2024) for prenatal.   I do recommend benadryl, pt will call if that doesn't help.     MEDICATIONS:  No orders of the defined types were placed in this encounter.    ORDERS:  No orders of the defined types were placed in this encounter.      More than 50% of this 20 min visit was education and counseling.        I SHAZIA Waller, am scribing for and in the presence of ALEXANDRE Perkins.  3/6/24  4:23 PM CST      I have seen and examined the patient independently.  I reviewed all laboratory and imaging studies that are relevant.  I have reviewed and made any appropriate changes to the HPI.    Electronically signed by RERE Busch CNM on 3/12/24  at 2:20 AM CDT

## 2024-03-06 NOTE — PATIENT INSTRUCTIONS
Patient Education        Weeks 32 to 34 of Your Pregnancy: Care Instructions    Decide whether you want to bank or donate your baby's umbilical cord blood. If you want to save this blood, you have to arrange for it ahead of time.   Decide about circumcision. Personal, Orthodox, or cultural beliefs may play a role in your decision. You get to decide what you want for your baby.     Learn how to ease hemorrhoids.    Get more liquids, fruits, vegetables, and fiber in your diet.  Avoid sitting for too long.  Clean yourself with moist toilet paper. Or try witch hazel pads.  Try ice packs or warm sitz baths for discomfort.  Use hydrocortisone cream for pain or itching.  Ask your doctor about stool softeners.    Consider the benefits of breastfeeding.    It reduces your baby's risk of sudden infant death syndrome (SIDS).   babies are less likely to get certain infections. And they're less likely to be obese or get diabetes later in life.  It can lower your risk of breast and ovarian cancers and osteoporosis.  It saves you money.  Follow-up care is a key part of your treatment and safety. Be sure to make and go to all appointments, and call your doctor if you are having problems. It's also a good idea to know your test results and keep a list of the medicines you take.  Where can you learn more?  Go to https://www.[email or web address].net/patientEd and enter X711 to learn more about \"Weeks 32 to 34 of Your Pregnancy: Care Instructions.\"  Current as of: July 11, 2023               Content Version: 13.9  © 2006-2023 [employer].   Care instructions adapted under license by [employer]. If you have questions about a medical condition or this instruction, always ask your healthcare professional. [employer] disclaims any warranty or liability for your use of this information.       Patient Education        Counting Your Baby's Kicks: Care Instructions  Overview     Counting your baby's kicks is one

## 2024-04-20 ENCOUNTER — READMISSION MANAGEMENT (OUTPATIENT)
Dept: CALL CENTER | Facility: HOSPITAL | Age: 24
End: 2024-04-20
Payer: MEDICAID

## 2024-04-20 NOTE — OUTREACH NOTE
Prep Survey      Flowsheet Row Responses   [facility] facility patient discharged from? Non-BH   Is LACE score < 7 ? Non-BH Discharge   Eligibility Not Eligible   What are the reasons patient is not eligible? Other  [Pregnancy]   Does the patient have one of the following disease processes/diagnoses(primary or secondary)? Other   Prep survey completed? Yes            BRETT A - Registered Nurse

## (undated) DEVICE — DRESSING FOAM W10XL20CM ANTIMIC SELF ADH SAFETAC TECHNOLOGY

## (undated) DEVICE — MASK ROUND 60MM FPH (10) S/USE: Brand: FISHER & PAYKEL HEALTHCARE

## (undated) DEVICE — PEN: MARKING STD 100/CS: Brand: MEDICAL ACTION INDUSTRIES

## (undated) DEVICE — EVAC 70 XTRA HP WAND: Brand: COBLATION

## (undated) DEVICE — BINDER ABD H12IN COT FOR 45-62IN WAIST UNIV PREM 4 PNL DSGN

## (undated) DEVICE — GARMENT COMPR STD FOR 17IN CALF UNIF THER FLOTRN

## (undated) DEVICE — PAD T&A PACK: Brand: MEDLINE INDUSTRIES, INC.

## (undated) DEVICE — HDRST POSITIONING FM RND 2X9IN

## (undated) DEVICE — SYSTEM SKIN CLSR 22CM DERMBND PRINEO

## (undated) DEVICE — SUTURE STRATAFIX SYMMETRIC PDS + SZ 1 L18IN ABSRB VLT OS-6 SXPP1A201

## (undated) DEVICE — STAPLER SKIN SQ 30 ABSRB STPL DISP INSORB

## (undated) DEVICE — TRAY EPI 25GA L3.5IN 0.75% BIPIVCAIN 8.25% D CONTAIN BPA

## (undated) DEVICE — GOWN,SIRUS,NON REINFRCD,LARGE,SET IN SL: Brand: MEDLINE

## (undated) DEVICE — BAPTIST TURNOVER KIT: Brand: MEDLINE INDUSTRIES, INC.

## (undated) DEVICE — SUTURE CHROMIC GUT SZ 2-0 L27IN ABSRB BRN L36MM CT-1 1/2 811H

## (undated) DEVICE — AIR SHEET,LAT,COMFORT GLIDE, BLEND 40X80: Brand: MEDLINE

## (undated) DEVICE — GLV SURG BIOGEL M LTX PF 7 1/2

## (undated) DEVICE — STERILE POLYISOPRENE POWDER-FREE SURGICAL GLOVES WITH EMOLLIENT COATING: Brand: PROTEXIS

## (undated) DEVICE — SUTURE VCRL SZ 0 L27IN ABSRB VLT L48MM CTX 1/2 CIR TAPR PNT J364H

## (undated) DEVICE — STERILE POLYISOPRENE POWDER-FREE SURGICAL GLOVES: Brand: PROTEXIS

## (undated) DEVICE — SYRINGE EAR 2OZ ULC ST BLB FLAT BTM PVC BROAD TIP REUSE

## (undated) DEVICE — TRAY,FOLEY,100% SIL,16FR 10ML: Brand: MEDLINE

## (undated) DEVICE — BLADE SURG NO10 C STL DISP ST

## (undated) DEVICE — 4-PORT MANIFOLD: Brand: NEPTUNE 2

## (undated) DEVICE — SUTURE VCRL + SZ 1-0 L36IN ABSRB UD CTX 1/2 CIR TAPR PNT VCP977H

## (undated) DEVICE — SOLUTION IV IRRIG POUR BRL 0.9% SODIUM CHL 2F7124

## (undated) DEVICE — TUBING, SUCTION, 1/4" X 12', STRAIGHT: Brand: MEDLINE

## (undated) DEVICE — Device

## (undated) DEVICE — SPONGE LAP W18XL18IN WHT COT 4 PLY FLD STRUNG RADPQ DISP ST